# Patient Record
Sex: FEMALE | Race: OTHER | Employment: UNEMPLOYED | URBAN - METROPOLITAN AREA
[De-identification: names, ages, dates, MRNs, and addresses within clinical notes are randomized per-mention and may not be internally consistent; named-entity substitution may affect disease eponyms.]

---

## 2020-03-30 ENCOUNTER — HOSPITAL ENCOUNTER (EMERGENCY)
Age: 25
Discharge: HOME | End: 2020-03-30
Payer: COMMERCIAL

## 2020-03-30 VITALS
HEIGHT: 63 IN | TEMPERATURE: 98.3 F | RESPIRATION RATE: 16 BRPM | WEIGHT: 140 LBS | HEART RATE: 96 BPM | DIASTOLIC BLOOD PRESSURE: 71 MMHG | SYSTOLIC BLOOD PRESSURE: 111 MMHG | OXYGEN SATURATION: 98 % | BODY MASS INDEX: 24.8 KG/M2

## 2020-03-30 LAB
ALBUMIN SERPL-MCNC: 4.6 G/DL (ref 3.5–5.2)
ALP BLD-CCNC: 60 U/L (ref 35–104)
ALT SERPL-CCNC: 12 U/L (ref 0–32)
ANION GAP SERPL CALCULATED.3IONS-SCNC: 17 MMOL/L (ref 7–16)
AST SERPL-CCNC: 18 U/L (ref 0–31)
BILIRUB SERPL-MCNC: 0.4 MG/DL (ref 0–1.2)
BUN BLDV-MCNC: 5 MG/DL (ref 6–20)
CALCIUM SERPL-MCNC: 9.3 MG/DL (ref 8.6–10.2)
CHLORIDE BLD-SCNC: 103 MMOL/L (ref 98–107)
CO2: 20 MMOL/L (ref 22–29)
CREAT SERPL-MCNC: 0.8 MG/DL (ref 0.5–1)
GFR AFRICAN AMERICAN: >60
GFR NON-AFRICAN AMERICAN: >60 ML/MIN/1.73
GLUCOSE BLD-MCNC: 101 MG/DL (ref 74–99)
HCG(URINE) PREGNANCY TEST: NEGATIVE
HCT VFR BLD CALC: 40.4 % (ref 34–48)
HEMOGLOBIN: 13.6 G/DL (ref 11.5–15.5)
MCH RBC QN AUTO: 29.3 PG (ref 26–35)
MCHC RBC AUTO-ENTMCNC: 33.7 % (ref 32–34.5)
MCV RBC AUTO: 87.1 FL (ref 80–99.9)
PDW BLD-RTO: 12.7 FL (ref 11.5–15)
PLATELET # BLD: 301 E9/L (ref 130–450)
PMV BLD AUTO: 9.3 FL (ref 7–12)
POTASSIUM SERPL-SCNC: 3.8 MMOL/L (ref 3.5–5)
RBC # BLD: 4.64 E12/L (ref 3.5–5.5)
SODIUM BLD-SCNC: 140 MMOL/L (ref 132–146)
TOTAL PROTEIN: 7.6 G/DL (ref 6.4–8.3)
WBC # BLD: 9.6 E9/L (ref 4.5–11.5)

## 2020-03-30 PROCEDURE — 85027 COMPLETE CBC AUTOMATED: CPT

## 2020-03-30 PROCEDURE — 36415 COLL VENOUS BLD VENIPUNCTURE: CPT

## 2020-03-30 PROCEDURE — 6360000002 HC RX W HCPCS: Performed by: NURSE PRACTITIONER

## 2020-03-30 PROCEDURE — 2580000003 HC RX 258: Performed by: NURSE PRACTITIONER

## 2020-03-30 PROCEDURE — 96374 THER/PROPH/DIAG INJ IV PUSH: CPT

## 2020-03-30 PROCEDURE — 99284 EMERGENCY DEPT VISIT MOD MDM: CPT

## 2020-03-30 PROCEDURE — 81025 URINE PREGNANCY TEST: CPT

## 2020-03-30 PROCEDURE — 96375 TX/PRO/DX INJ NEW DRUG ADDON: CPT

## 2020-03-30 PROCEDURE — 80053 COMPREHEN METABOLIC PANEL: CPT

## 2020-03-30 RX ORDER — SUMATRIPTAN 100 MG/1
100 TABLET, FILM COATED ORAL
Status: ON HOLD | COMMUNITY
End: 2021-12-02

## 2020-03-30 RX ORDER — DIPHENHYDRAMINE HYDROCHLORIDE 50 MG/ML
12.5 INJECTION INTRAMUSCULAR; INTRAVENOUS ONCE
Status: COMPLETED | OUTPATIENT
Start: 2020-03-30 | End: 2020-03-30

## 2020-03-30 RX ORDER — NAPROXEN 500 MG/1
500 TABLET ORAL 2 TIMES DAILY WITH MEALS
COMMUNITY
End: 2020-03-30

## 2020-03-30 RX ORDER — 0.9 % SODIUM CHLORIDE 0.9 %
1000 INTRAVENOUS SOLUTION INTRAVENOUS ONCE
Status: COMPLETED | OUTPATIENT
Start: 2020-03-30 | End: 2020-03-30

## 2020-03-30 RX ORDER — NORTRIPTYLINE HYDROCHLORIDE 75 MG/1
75 CAPSULE ORAL NIGHTLY
COMMUNITY

## 2020-03-30 RX ORDER — KETOROLAC TROMETHAMINE 30 MG/ML
30 INJECTION, SOLUTION INTRAMUSCULAR; INTRAVENOUS ONCE
Status: COMPLETED | OUTPATIENT
Start: 2020-03-30 | End: 2020-03-30

## 2020-03-30 RX ORDER — METOCLOPRAMIDE HYDROCHLORIDE 5 MG/ML
10 INJECTION INTRAMUSCULAR; INTRAVENOUS ONCE
Status: COMPLETED | OUTPATIENT
Start: 2020-03-30 | End: 2020-03-30

## 2020-03-30 RX ORDER — LORATADINE 10 MG/1
10 TABLET ORAL DAILY
COMMUNITY

## 2020-03-30 RX ORDER — METOCLOPRAMIDE 10 MG/1
10 TABLET ORAL 3 TIMES DAILY PRN
Qty: 30 TABLET | Refills: 0 | Status: SHIPPED | OUTPATIENT
Start: 2020-03-30 | End: 2022-09-05

## 2020-03-30 RX ORDER — NAPROXEN 500 MG/1
500 TABLET ORAL 2 TIMES DAILY WITH MEALS
Qty: 20 TABLET | Refills: 0 | Status: SHIPPED | OUTPATIENT
Start: 2020-03-30 | End: 2022-09-05

## 2020-03-30 RX ORDER — FAMOTIDINE 40 MG/1
40 TABLET, FILM COATED ORAL EVERY EVENING
COMMUNITY
End: 2022-09-05

## 2020-03-30 RX ORDER — DEXAMETHASONE SODIUM PHOSPHATE 10 MG/ML
10 INJECTION, SOLUTION INTRAMUSCULAR; INTRAVENOUS ONCE
Status: COMPLETED | OUTPATIENT
Start: 2020-03-30 | End: 2020-03-30

## 2020-03-30 RX ADMIN — DIPHENHYDRAMINE HYDROCHLORIDE 12.5 MG: 50 INJECTION, SOLUTION INTRAMUSCULAR; INTRAVENOUS at 17:33

## 2020-03-30 RX ADMIN — KETOROLAC TROMETHAMINE 30 MG: 30 INJECTION, SOLUTION INTRAMUSCULAR at 18:13

## 2020-03-30 RX ADMIN — METOCLOPRAMIDE 10 MG: 5 INJECTION, SOLUTION INTRAMUSCULAR; INTRAVENOUS at 17:32

## 2020-03-30 RX ADMIN — DEXAMETHASONE SODIUM PHOSPHATE 10 MG: 10 INJECTION, SOLUTION INTRAMUSCULAR; INTRAVENOUS at 19:02

## 2020-03-30 RX ADMIN — SODIUM CHLORIDE 1000 ML: 9 INJECTION, SOLUTION INTRAVENOUS at 17:32

## 2020-03-30 ASSESSMENT — VISUAL ACUITY
OD: 20/20
OS: 20/20
OU: 20/20

## 2020-03-30 ASSESSMENT — PAIN SCALES - GENERAL
PAINLEVEL_OUTOF10: 7
PAINLEVEL_OUTOF10: 6
PAINLEVEL_OUTOF10: 7
PAINLEVEL_OUTOF10: 7

## 2020-03-30 ASSESSMENT — PAIN DESCRIPTION - LOCATION: LOCATION: HEAD

## 2020-03-30 NOTE — ED NOTES
No emesis-  Slight decrease in headache- ok with plan for discharge     Kimberley Rose, RN  03/30/20 3101

## 2020-03-30 NOTE — ED PROVIDER NOTES
[facility]  Department of Emergency Medicine   ED  Provider Note  Admit Date/RoomTime: 3/30/2020  4:30 PM  ED Room: 04/04   Chief Complaint:   Migraine (hx of migraine headache.  took imitrex @ 1300) and Eye Problem (\"My vision became spotty\")    History of Present Illness   Source of history provided by:  patient. History/Exam Limitations: none. Juan Guzman is a 22 y.o. old female who has a past medical hx of:   Past Medical History:   Diagnosis Date    Asthma     Migraines     presents to the emergency department by private vehicle, for complaint of gradual onset bilateral aching pain which began a few hour(s) prior to arrival.  Since onset the symptoms have been no change and mild-moderate in severity. The patient has history of migraine headaches diagnosed in the past.   Today's episode is somewhat typical for her. She has had CT, neurology consult and PCP evaluation in the past. The pain is associated with nausea, photophobia and feels like she is seeing spots and negative for fever, neck pain, numbness, weakness, speech or visual changes. The symptoms are aggravated by moving head and relieved by nothing. She took aleve and imitrex today with no improvement. There has been no history of recent trauma. She has a history of no anticoagulation use. ROS    Pertinent positives and negatives are stated within HPI, all other systems reviewed and are negative. No past surgical history on file. Social History:  reports that she has never smoked. She does not have any smokeless tobacco history on file. She reports that she does not drink alcohol or use drugs. Family History: family history is not on file.    Allergies: Latex    Physical Exam           ED Triage Vitals   BP Temp Temp Source Pulse Resp SpO2 Height Weight   03/30/20 1639 03/30/20 1639 03/30/20 1639 03/30/20 1639 03/30/20 1639 03/30/20 1639 03/30/20 1638 03/30/20 1638   112/80 98.3 °F (36.8 °C) Temporal 148 16 98 % 5' 2.5\" (1.588 m) 140 lb (63.5 kg)    Oxygen Saturation Interpretation: Normal.    Constitutional:  Alert, development consistent with age. HEENT:  NC/NT. PERRLA. Airway patent. Neck:  Normal ROM. Supple. No meningeal signs  Respiratory:  Clear to auscultation and breath sounds equal.  CV:  Regular rate and rhythm, normal heart sounds, without pathological murmurs, ectopy, gallops, or rubs. GI:  Abdomen Soft, nontender, good bowel sounds. No firm or pulsatile mass. Back:  No costovertebral tenderness. Extremities: No tenderness or edema noted. Integument:  Normal turgor. Warm, dry, without visible rash, unless noted elsewhere. Lymphatics: No lymphangitis or adenopathy noted. Neurological:  Oriented x 3, GCS 15. CNII-XII grossly intact. Motor functions intact.      Lab / Imaging Results   (All laboratory and radiology results have been personally reviewed by myself)  Labs:  Results for orders placed or performed during the hospital encounter of 03/30/20   CBC   Result Value Ref Range    WBC 9.6 4.5 - 11.5 E9/L    RBC 4.64 3.50 - 5.50 E12/L    Hemoglobin 13.6 11.5 - 15.5 g/dL    Hematocrit 40.4 34.0 - 48.0 %    MCV 87.1 80.0 - 99.9 fL    MCH 29.3 26.0 - 35.0 pg    MCHC 33.7 32.0 - 34.5 %    RDW 12.7 11.5 - 15.0 fL    Platelets 211 803 - 352 E9/L    MPV 9.3 7.0 - 12.0 fL   Comprehensive Metabolic Panel   Result Value Ref Range    Sodium 140 132 - 146 mmol/L    Potassium 3.8 3.5 - 5.0 mmol/L    Chloride 103 98 - 107 mmol/L    CO2 20 (L) 22 - 29 mmol/L    Anion Gap 17 (H) 7 - 16 mmol/L    Glucose 101 (H) 74 - 99 mg/dL    BUN 5 (L) 6 - 20 mg/dL    CREATININE 0.8 0.5 - 1.0 mg/dL    GFR Non-African American >60 >=60 mL/min/1.73    GFR African American >60     Calcium 9.3 8.6 - 10.2 mg/dL    Total Protein 7.6 6.4 - 8.3 g/dL    Alb 4.6 3.5 - 5.2 g/dL    Total Bilirubin 0.4 0.0 - 1.2 mg/dL    Alkaline Phosphatase 60 35 - 104 U/L    ALT 12 0 - 32 U/L    AST 18 0 - 31 U/L   Pregnancy, urine   Result Value Ref Range    HCG(Urine)

## 2021-11-23 ENCOUNTER — APPOINTMENT (OUTPATIENT)
Dept: CT IMAGING | Age: 26
End: 2021-11-23
Payer: COMMERCIAL

## 2021-11-23 ENCOUNTER — HOSPITAL ENCOUNTER (EMERGENCY)
Age: 26
Discharge: HOME | End: 2021-11-23
Attending: EMERGENCY MEDICINE
Payer: COMMERCIAL

## 2021-11-23 ENCOUNTER — APPOINTMENT (OUTPATIENT)
Dept: GENERAL RADIOLOGY | Age: 26
End: 2021-11-23
Payer: COMMERCIAL

## 2021-11-23 VITALS
HEART RATE: 95 BPM | DIASTOLIC BLOOD PRESSURE: 81 MMHG | RESPIRATION RATE: 20 BRPM | OXYGEN SATURATION: 100 % | TEMPERATURE: 98.4 F | SYSTOLIC BLOOD PRESSURE: 126 MMHG

## 2021-11-23 DIAGNOSIS — S62.101A CLOSED FRACTURE OF RIGHT WRIST, INITIAL ENCOUNTER: ICD-10-CM
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
## 2021-11-23 LAB
HCG, URINE, POC: NEGATIVE
Lab: NORMAL
NEGATIVE QC PASS/FAIL: NORMAL
POSITIVE QC PASS/FAIL: NORMAL

## 2021-11-23 PROCEDURE — 99284 EMERGENCY DEPT VISIT MOD MDM: CPT

## 2021-11-23 PROCEDURE — 96372 THER/PROPH/DIAG INJ SC/IM: CPT

## 2021-11-23 PROCEDURE — 6370000000 HC RX 637 (ALT 250 FOR IP): Performed by: EMERGENCY MEDICINE

## 2021-11-23 PROCEDURE — 6360000002 HC RX W HCPCS: Performed by: EMERGENCY MEDICINE

## 2021-11-23 PROCEDURE — 73110 X-RAY EXAM OF WRIST: CPT

## 2021-11-23 PROCEDURE — 2500000003 HC RX 250 WO HCPCS: Performed by: STUDENT IN AN ORGANIZED HEALTH CARE EDUCATION/TRAINING PROGRAM

## 2021-11-23 PROCEDURE — 73100 X-RAY EXAM OF WRIST: CPT

## 2021-11-23 PROCEDURE — 71045 X-RAY EXAM CHEST 1 VIEW: CPT

## 2021-11-23 PROCEDURE — 70450 CT HEAD/BRAIN W/O DYE: CPT

## 2021-11-23 PROCEDURE — 73090 X-RAY EXAM OF FOREARM: CPT

## 2021-11-23 PROCEDURE — 2500000003 HC RX 250 WO HCPCS

## 2021-11-23 PROCEDURE — 6360000002 HC RX W HCPCS: Performed by: STUDENT IN AN ORGANIZED HEALTH CARE EDUCATION/TRAINING PROGRAM

## 2021-11-23 PROCEDURE — 72125 CT NECK SPINE W/O DYE: CPT

## 2021-11-23 RX ORDER — LIDOCAINE HYDROCHLORIDE 10 MG/ML
10 INJECTION, SOLUTION EPIDURAL; INFILTRATION; INTRACAUDAL; PERINEURAL ONCE
Status: COMPLETED | OUTPATIENT
Start: 2021-11-23 | End: 2021-11-23

## 2021-11-23 RX ORDER — LIDOCAINE HYDROCHLORIDE 10 MG/ML
INJECTION, SOLUTION INFILTRATION; PERINEURAL
Status: DISCONTINUED
Start: 2021-11-23 | End: 2021-11-23

## 2021-11-23 RX ORDER — FENTANYL CITRATE 50 UG/ML
50 INJECTION, SOLUTION INTRAMUSCULAR; INTRAVENOUS ONCE
Status: COMPLETED | OUTPATIENT
Start: 2021-11-23 | End: 2021-11-23

## 2021-11-23 RX ORDER — FENTANYL CITRATE 50 UG/ML
50 INJECTION, SOLUTION INTRAMUSCULAR; INTRAVENOUS ONCE
Status: DISCONTINUED | OUTPATIENT
Start: 2021-11-23 | End: 2021-11-23

## 2021-11-23 RX ORDER — OXYCODONE HYDROCHLORIDE AND ACETAMINOPHEN 5; 325 MG/1; MG/1
1 TABLET ORAL ONCE
Status: COMPLETED | OUTPATIENT
Start: 2021-11-23 | End: 2021-11-23

## 2021-11-23 RX ORDER — HYDROCODONE BITARTRATE AND ACETAMINOPHEN 5; 325 MG/1; MG/1
1 TABLET ORAL EVERY 4 HOURS PRN
Qty: 18 TABLET | Refills: 0 | Status: SHIPPED | OUTPATIENT
Start: 2021-11-23 | End: 2021-11-26

## 2021-11-23 RX ADMIN — OXYCODONE AND ACETAMINOPHEN 1 TABLET: 5; 325 TABLET ORAL at 23:04

## 2021-11-23 RX ADMIN — FENTANYL CITRATE 50 MCG: 50 INJECTION INTRAMUSCULAR; INTRAVENOUS at 18:58

## 2021-11-23 RX ADMIN — LIDOCAINE HYDROCHLORIDE 10 ML: 10 INJECTION, SOLUTION EPIDURAL; INFILTRATION; INTRACAUDAL; PERINEURAL at 19:55

## 2021-11-23 RX ADMIN — LIDOCAINE HYDROCHLORIDE: 10 INJECTION, SOLUTION INFILTRATION; PERINEURAL at 20:50

## 2021-11-23 RX ADMIN — FENTANYL CITRATE 50 MCG: 50 INJECTION, SOLUTION INTRAMUSCULAR; INTRAVENOUS at 19:53

## 2021-11-23 ASSESSMENT — PAIN SCALES - GENERAL
PAINLEVEL_OUTOF10: 8
PAINLEVEL_OUTOF10: 10
PAINLEVEL_OUTOF10: 10
PAINLEVEL_OUTOF10: 7
PAINLEVEL_OUTOF10: 8
PAINLEVEL_OUTOF10: 10

## 2021-11-23 ASSESSMENT — PAIN DESCRIPTION - PAIN TYPE: TYPE: ACUTE PAIN

## 2021-11-23 ASSESSMENT — PAIN DESCRIPTION - DESCRIPTORS: DESCRIPTORS: SHARP;SHOOTING;POUNDING

## 2021-11-23 ASSESSMENT — PAIN DESCRIPTION - LOCATION: LOCATION: WRIST

## 2021-11-23 ASSESSMENT — PAIN DESCRIPTION - ORIENTATION: ORIENTATION: RIGHT

## 2021-11-24 ENCOUNTER — TELEPHONE (OUTPATIENT)
Dept: ORTHOPEDIC SURGERY | Age: 26
End: 2021-11-24

## 2021-11-24 NOTE — TELEPHONE ENCOUNTER
Call returned. Appointment scheduled at this time per Dr. Paulo Landau recommendations. Directions provided. Encouraged to call with questions or concerns.     Future Appointments   Date Time Provider Benita Levine   11/30/2021 11:00 AM SCHEDULE, [facility]  [facility]

## 2021-11-24 NOTE — ED NOTES
LUE splint by Ortho intact, clean and dry. Sling in place. +CMN LUE. Education on splint care given by physicians. Pt and family deny questions. Pain medication prior to d/c was requested and given.        Doug Thurston RN  11/23/21 0907
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
## 2021-11-30 ENCOUNTER — HOSPITAL ENCOUNTER (OUTPATIENT)
Dept: GENERAL RADIOLOGY | Age: 26
Discharge: HOME | End: 2021-12-02
Payer: COMMERCIAL

## 2021-11-30 ENCOUNTER — OFFICE VISIT (OUTPATIENT)
Dept: ORTHOPEDIC SURGERY | Age: 26
End: 2021-11-30
Payer: COMMERCIAL

## 2021-11-30 ENCOUNTER — PREP FOR PROCEDURE (OUTPATIENT)
Dept: ORTHOPEDIC SURGERY | Age: 26
End: 2021-11-30

## 2021-11-30 VITALS — BODY MASS INDEX: 25.69 KG/M2 | HEIGHT: 63 IN | WEIGHT: 145 LBS | TEMPERATURE: 98.5 F

## 2021-11-30 DIAGNOSIS — S52.571A OTHER CLOSED INTRA-ARTICULAR FRACTURE OF DISTAL END OF RIGHT RADIUS, INITIAL ENCOUNTER: Primary | ICD-10-CM

## 2021-11-30 DIAGNOSIS — S52.501A CLOSED FRACTURE OF DISTAL END OF RIGHT RADIUS, UNSPECIFIED FRACTURE MORPHOLOGY, INITIAL ENCOUNTER: Primary | ICD-10-CM

## 2021-11-30 DIAGNOSIS — S52.501A CLOSED FRACTURE OF DISTAL END OF RIGHT RADIUS, UNSPECIFIED FRACTURE MORPHOLOGY, INITIAL ENCOUNTER: ICD-10-CM

## 2021-11-30 PROBLEM — J45.20 MILD INTERMITTENT ASTHMA WITHOUT COMPLICATION: Status: ACTIVE | Noted: 2021-11-30

## 2021-11-30 PROCEDURE — 99203 OFFICE O/P NEW LOW 30 MIN: CPT | Performed by: PHYSICIAN ASSISTANT

## 2021-11-30 PROCEDURE — G8484 FLU IMMUNIZE NO ADMIN: HCPCS | Performed by: PHYSICIAN ASSISTANT

## 2021-11-30 PROCEDURE — 99202 OFFICE O/P NEW SF 15 MIN: CPT | Performed by: PHYSICIAN ASSISTANT

## 2021-11-30 PROCEDURE — G8427 DOCREV CUR MEDS BY ELIG CLIN: HCPCS | Performed by: PHYSICIAN ASSISTANT

## 2021-11-30 PROCEDURE — 1036F TOBACCO NON-USER: CPT | Performed by: PHYSICIAN ASSISTANT

## 2021-11-30 PROCEDURE — 73110 X-RAY EXAM OF WRIST: CPT

## 2021-11-30 PROCEDURE — G8419 CALC BMI OUT NRM PARAM NOF/U: HCPCS | Performed by: PHYSICIAN ASSISTANT

## 2021-11-30 RX ORDER — KETOROLAC TROMETHAMINE 10 MG/1
10 TABLET, FILM COATED ORAL EVERY 6 HOURS PRN
Status: ON HOLD | COMMUNITY
Start: 2021-10-07 | End: 2022-09-05 | Stop reason: HOSPADM

## 2021-11-30 RX ORDER — RIBOFLAVIN (VITAMIN B2) 100 MG
1 TABLET ORAL DAILY
COMMUNITY
Start: 2021-11-09

## 2021-11-30 RX ORDER — TRAMADOL HYDROCHLORIDE 50 MG/1
50 TABLET ORAL EVERY 6 HOURS PRN
Qty: 28 TABLET | Refills: 0 | Status: SHIPPED | OUTPATIENT
Start: 2021-11-30 | End: 2021-12-07

## 2021-11-30 RX ORDER — UBROGEPANT 100 MG/1
1 TABLET ORAL PRN
Status: ON HOLD | COMMUNITY
Start: 2021-11-20 | End: 2022-09-05 | Stop reason: HOSPADM

## 2021-11-30 RX ORDER — MONTELUKAST SODIUM 10 MG/1
1 TABLET ORAL NIGHTLY
COMMUNITY
Start: 2021-10-28

## 2021-11-30 RX ORDER — SODIUM CHLORIDE 0.9 % (FLUSH) 0.9 %
5-40 SYRINGE (ML) INJECTION PRN
Status: CANCELLED | OUTPATIENT
Start: 2021-11-30

## 2021-11-30 RX ORDER — BACLOFEN 20 MG
500 TABLET ORAL DAILY
COMMUNITY
Start: 2021-05-17

## 2021-11-30 RX ORDER — SODIUM CHLORIDE 9 MG/ML
25 INJECTION, SOLUTION INTRAVENOUS PRN
Status: CANCELLED | OUTPATIENT
Start: 2021-11-30

## 2021-11-30 RX ORDER — SODIUM CHLORIDE 0.9 % (FLUSH) 0.9 %
5-40 SYRINGE (ML) INJECTION EVERY 12 HOURS SCHEDULED
Status: CANCELLED | OUTPATIENT
Start: 2021-11-30

## 2021-11-30 RX ORDER — FREMANEZUMAB-VFRM 225 MG/1.5ML
225 INJECTION SUBCUTANEOUS
Status: ON HOLD | COMMUNITY
Start: 2021-05-28 | End: 2022-09-05 | Stop reason: HOSPADM

## 2021-11-30 NOTE — PROGRESS NOTES
[facility] 37 PRE-ADMISSION TESTING GENERAL INSTRUCTIONS- [facility]-phone number:[phone]    GENERAL INSTRUCTIONS  [x] Antibacterial Soap shower Night before and/or AM of Surgery  [] Mario wipe instruction sheet and wipes given. [x] Nothing by mouth after midnight, including gum, candy, mints, or water. [x] You may brush your teeth, gargle, but do NOT swallow water. []Hibiclens shower  the night before and the morning of surgery. Do not use             Hibiclens on your face or head. [x]No smoking, chewing tobacco, illegal drugs, or alcohol within 24 hours of your surgery. [x] Jewelry, valuables or body piercing's should not be brought to the hospital. All body and/or tongue piercing's must be removed prior to arriving to hospital.  ALL hair pins must be removed. [x] Do not wear makeup, lotions, powders, deodorant. Nail polish as directed by the nurse. [x] Arrange transportation with a responsible adult  to and from the hospital. If you do not have a responsible adult  to transport you, you will need to make arrangements with a medical transportation company (i.e. [employer]. A Uber/taxi/bus is not appropriate unless you are accompanied by a responsible adult ). Arrange for someone to be with you for the remainder of the day and for 24 hours after your procedure due to having had anesthesia. Who will be your  for transportation? __HUSBAND, TREMAINE_______________   Who will be staying with you for 24 hrs after your procedure?_________TREMAINE_________  [x] Bring insurance card and photo ID.  [] Transfusion Bracelet: Please bring with you to hospital, day of surgery  [x] Bring urine specimen day of surgery. Any small container is acceptable. [] Use inhalers the morning of surgery and bring with you to hospital.  [] Bring copy of living will or healthcare power of  papers to be placed in your electronic record.   [] CPAP/BI-PAP: Please bring your machine if you are to spend the night in the hospital.     PARKING INSTRUCTIONS:   [x] Arrival Time:____1200_________  · [x] Parking lot '\"I\"  is located on [facility] (the corner of [facility] and [facility]). To enter, press the button and the gate will lift. A free token will be provided to exit the lot. One car per patient is allowed to park in this lot. All other cars are to park on [address] [place] [address] either in the parking garage or the handicap lot. [] To reach the [facility] lobby from [address] [place] [address], upon entering the hospital, take elevator B to the 3rd floor. EDUCATION INSTRUCTIONS:      [] Knee or hip replacement booklet & exercise pamphlets given. [] Onel 77 placed in chart. [] Pre-admission Testing educational folder given  [x] Incentive Spirometry,coughing & deep breathing exercises reviewed. []Medication information sheet(s)   [x]Fluoroscopy-Xray used in surgery reviewed with patient. Educational pamphlet placed in chart. [x]Pain: Post-op pain is normal and to be expected. You will be asked to rate your pain from 0-10(a zero is not acceptable-education is needed). Your post-op pain goal is:  [x] Ask your nurse for your pain medication. [] Joint camp offered. [] Joint replacement booklets given. [] Other:___________________________    MEDICATION INSTRUCTIONS:   [x]Bring a complete list of your medications, please write the last time you took the medicine, give this list to the nurse. [x] Take the following medications the morning of surgery with 1-2 ounces of water:   [x] Stop herbal supplements and vitamins 5 days before your surgery. [] DO NOT take any diabetic medicine the morning of surgery. Follow instructions for insulin the day before surgery. [] If you are diabetic and your blood sugar is low or you feel symptomatic, you may drink 1-2 ounces of apple juice or take a glucose tablet.   The morning of your procedure, you may call the pre-op area if you have concerns about your blood sugar [phone]. [x] Use your inhalers the morning of surgery. Bring your emergency inhaler with you day of surgery. [x] Follow physician instructions regarding any blood thinners you may be taking. WHAT TO EXPECT:  [x] The day of surgery you will be greeted and checked in by the [employer].  In addition, you will be registered in the [facility] by a Patient Access Representative. Please bring your photo ID and insurance card. A nurse will greet you in accordance to the time you are needed in the pre-op area to prepare you for surgery. Please do not be discouraged if you are not greeted in the order you arrive as there are many variables that are involved in patient preparation. Your patience is greatly appreciated as you wait for your nurse. Please bring in items such as: books, magazines, newspapers, electronics, or any other items  to occupy your time in the waiting area. [x]  Delays may occur with surgery and staff will make a sincere effort to keep you informed of delays. If any delays occur with your procedure, we apologize ahead of time for your inconvenience as we recognize the value of your time.

## 2021-11-30 NOTE — H&P
chills, diaphoresis, appetite change and fatigue. HENT: Negative for dental issues, hearing loss and tinnitus. Negative for congestion, sinus pressure, sneezing, sore throat. Negative for headache. Eyes: Negative for visual disturbance, blurred and double vision. Negative for pain, discharge, redness and itching  Respiratory: Negative for cough, shortness of breath and wheezing. Cardiovascular: Negative for chest pain, palpitations and leg swelling. No dyspnea on exertion   Gastrointestinal:   Negative for nausea, vomiting, abdominal pain, diarrhea, constipation  or black or bloody. Hematologic\Lymphatic:  negative for bleeding, petechiae,   Genitourinary: Negative for hematuria and difficulty urinating. Musculoskeletal: Negative for neck pain and stiffness. Mild for back pain, negative joint swelling and gait problem. Skin: Negative for pallor, rash and wound. Neurological: Negative for dizziness, tremors, seizures, weakness, light-headedness, no TIA or stroke symptoms. No numbness and headaches.    Psychiatric/Behavioral: Negative.      Physical Examination:   General appearance: alert, well appearing, and in no distress,  normal appearing weight  Mental status: alert, oriented to person, place, and time, normal mood, behavior, speech, dress, motor activity, and thought processes  Abdomen: soft, nondistended   Resp:   resp easy and unlabored, no audible wheezes note  Cardiac: distal pulses palpable, skin well perfused  Neurological: alert, oriented X3, normal speech, no focal findings or movement disorder noted, motor and sensory grossly normal bilaterally, normal muscle tone, no tremors, strength 5/5, normal gait and station  HEENT: normochephalic atraumatic, external ears and eyes normal, sclera normal, neck supple  Extremities:   peripheral pulses normal, no edema, redness or tenderness in the calves   Skin: normal coloration, no rashes or open wounds, no suspicious skin lesions noted  Psych: Affect euthymic   Musculoskeletal:    Extremity:  right Upper Extremity  · Sugartong splint clean/dry/intact, well fitting  · Skin intact circumferentially outside of the splint  · No pain with passive stretch of fingers  · Mild edema to the fingers  · +AIN/PIN/Ulnar nerve function intact grossly  · +Radial pulse, Brisk Cap refill, hand warm and perfused  · Sensation intact to touch in radial/ulnar/median nerve distributions to hand     Temp 98.5 °F (36.9 °C) (Oral)   Ht 5' 2.5\" (1.588 m)   Wt 145 lb (65.8 kg)   LMP 11/22/2021   BMI 26.10 kg/m²      XR: 11/30/21 3V of the R wrist demonstrating an intra-articular distal radius fracture. [place] volar. Highly comminuted with impaction at the lunate facet. Overall shortening evident.     ASSESSMENT:       Diagnosis Orders   1. Other closed intra-articular fracture of distal end of right radius, initial encounter  traMADol (ULTRAM) 50 MG tablet     Ascorbic Acid (VITAMIN C) 500 MG CHEW         Discussion:  Had lengthy discussion with patient regarding Her diagnosis, typical prognosis, and expected outcomes. I reviewed the possible complications from the injury itself despite treatment choosen. I also discussed treatment options including nonoperative managements versus surgical management, along with risks and benefits of each. They have elected for surgical management at this time.       Discussed with patient factors that can impact patient's fracture healing. Patient has the following risk factors for union: none identified     Risk, benefits and treatment options discussed with NORA Byrd. she has verbalized understanding of options.  The possibility of complications were also discussed to include but not limited to nerve damage, infection, problems with wound healing, vascular injury, chronic pain, stiffness, dysfunction, nonhealing of the bone, symptomatic hardware and/or its failure, need for subsequent surgery, dislocation, and blood clots as well as medical related problems and other problems not specifically discussed. Risk of anesthesia also discussed to include death. Post-op care, work, activity and restrictions which included the use of pain medication and possibility of using blood thinner post op were also discussed with Thalia Swartz and she verbalized and agreed with the restrictions.      PLAN:  1. Your surgery is scheduled for Thursday 12/2/21 at 2:00 PM  with Dr. Shanita Boucher MD at the [facility] on [facility] in [facility] . You will need to report to Preop area  that morning at 12:00 PM    2. You are having Outpatient surgery so you will be returning home the same day  3. Preadmission Testing (PAT) department at [facility] will contact you with all the details prior to surgery. 4. Nothing to eat or drink after midnight the night before surgery. You may take a pain pill and any other medicine PAT instructs you to take with small sip of water if needed. 5. Keep splint clean and dry. Do not remove or get wet. 6. Continue with ice and elevation to reduce swelling  7. No weight bearing right upper extremity, use assistive devices  8. Take pain medicine as instructed  9. Call office with any question or concerns: [phone]. Hold Aspirin the day of surgery.  Hold all NSAIDs(ibuprofen/aleve/motrin/TORADOL) Now until surgery  Pre-op COVID-19 testing not indicated  Patient Vaccinated with "[employer]" SYSTEM - BASTROP 7/16/21, 8/13/21  Started patient on Vitamin C   Pain medication prescribed- Tramadol     Controlled Substance Monitoring:     Acute and Chronic Pain Monitoring:   RX Monitoring 11/30/2021   Periodic Controlled Substance Monitoring No signs of potential drug abuse or diversion identified.      Electronically signed by Mahi Herzog PA-C on 11/30/2021 at 1:52 PM  Note: This report was completed using computerize voiced recognition [employer] effort has been made to ensure accuracy; however, inadvertent computerized transcription errors may be present. Office Visit on 11/30/2021           Office Visit on 11/30/2021                Detailed Report            Note shared with patient        Progress Notes Info    Author Note Status Last Update User Last Update Date/Time   Crescencio Goldman PA-C Signed Crescencio Goldman PA-C 11/30/2021  1:52 PM     Chart Review Routing History    No routing history on file.

## 2021-11-30 NOTE — PATIENT INSTRUCTIONS
1. Your surgery is scheduled for Thursday 12/2/21 at 2:00 PM  with Dr. Beatriz Rivera MD at the main [address] [facility] on [facility] in [facility] . You will need to report to Preop area  that morning at 12:00 PM    2. You are having Outpatient surgery so you will be returning home the same day  3. Preadmission Testing (PAT) department at [facility] will contact you with all the details prior to surgery. 4. Nothing to eat or drink after midnight the night before surgery. You may take a pain pill and any other medicine PAT instructs you to take with small sip of water if needed. 5. Keep splint clean and dry. Do not remove or get wet. 6. Continue with ice and elevation to reduce swelling  7. No weight bearing right upper extremity, use assistive devices  8. Take pain medicine as instructed  9. Call office with any question or concerns: [phone]. Hold Aspirin the day of surgery. Hold all NSAIDs(ibuprofen/aleve/motrin/TORADOL) Now until surgery    All surgical patients will be gradually tapered off narcotic pain medication post operatively, this office will not continue narcotics longer than 6 weeks from date of surgery. If narcotics are required longer than this time period without objective finding you will be referred to pain management. Plan for trial of Ultram for pain control- if this is not effective can go back to [place] if you would prefer    Will start with Vitamin C to help prevent complex regional pain syndrome to the wrist    Also plan for off work x 2 weeks post op then if you are comfortable we can send back to work with modifications    Open Reduction With Internal Fixation for Limb Fracture: Before Your Surgery    What is open reduction with internal fixation? Open reduction with internal fixation is a type of surgery to fix a broken (fractured) bone. The doctor makes a cut, called an incision, in the skin over the bone.  The doctor then moves the pieces of bone back into the normal position. This is called open reduction. The doctor may use special screws, pins, plates, or rods to hold the bone in place while it heals. This is called internal fixation. These devices may stay in your body from now on. The doctor closes the incision with stitches. You will have a scar, but it will fade with time. You may spend from a few hours to a few days in the hospital. This depends on how serious your injury is. It usually takes 6 to 12 weeks for a broken bone to heal.    How soon you can go back to work and your normal routine depends on your job. It also depends on how long it takes your bone to heal. For example, if you have a broken leg and you sit at work, you may be able to go back in 1 to 2 weeks. But if your job requires you to walk or stand a lot, you may need to wait until your bone has healed.
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
## 2021-12-01 ENCOUNTER — ANESTHESIA EVENT (OUTPATIENT)
Dept: OPERATING ROOM | Age: 26
End: 2021-12-01
Payer: COMMERCIAL

## 2021-12-02 ENCOUNTER — APPOINTMENT (OUTPATIENT)
Dept: GENERAL RADIOLOGY | Age: 26
End: 2021-12-02
Attending: ORTHOPAEDIC SURGERY
Payer: COMMERCIAL

## 2021-12-02 ENCOUNTER — ANESTHESIA (OUTPATIENT)
Dept: OPERATING ROOM | Age: 26
End: 2021-12-02
Payer: COMMERCIAL

## 2021-12-02 ENCOUNTER — HOSPITAL ENCOUNTER (OUTPATIENT)
Age: 26
Setting detail: OUTPATIENT SURGERY
Discharge: HOME | End: 2021-12-02
Attending: ORTHOPAEDIC SURGERY | Admitting: ORTHOPAEDIC SURGERY
Payer: COMMERCIAL

## 2021-12-02 VITALS
SYSTOLIC BLOOD PRESSURE: 119 MMHG | HEIGHT: 63 IN | TEMPERATURE: 97.3 F | RESPIRATION RATE: 9 BRPM | WEIGHT: 145 LBS | DIASTOLIC BLOOD PRESSURE: 88 MMHG | OXYGEN SATURATION: 95 % | BODY MASS INDEX: 25.69 KG/M2 | HEART RATE: 113 BPM

## 2021-12-02 VITALS — DIASTOLIC BLOOD PRESSURE: 62 MMHG | OXYGEN SATURATION: 100 % | SYSTOLIC BLOOD PRESSURE: 115 MMHG | TEMPERATURE: 94.5 F

## 2021-12-02 DIAGNOSIS — Z01.812 PRE-OPERATIVE LABORATORY EXAMINATION: Primary | ICD-10-CM

## 2021-12-02 DIAGNOSIS — S52.571A OTHER CLOSED INTRA-ARTICULAR FRACTURE OF DISTAL END OF RIGHT RADIUS, INITIAL ENCOUNTER: ICD-10-CM

## 2021-12-02 LAB
BASOPHILS ABSOLUTE: 0.04 E9/L (ref 0–0.2)
BASOPHILS RELATIVE PERCENT: 0.6 % (ref 0–2)
EOSINOPHILS ABSOLUTE: 0.45 E9/L (ref 0.05–0.5)
EOSINOPHILS RELATIVE PERCENT: 6.8 % (ref 0–6)
HCG, URINE, POC: NEGATIVE
HCT VFR BLD CALC: 39 % (ref 34–48)
HEMOGLOBIN: 13.1 G/DL (ref 11.5–15.5)
IMMATURE GRANULOCYTES #: 0.01 E9/L
IMMATURE GRANULOCYTES %: 0.2 % (ref 0–5)
INR BLD: 1.1
LYMPHOCYTES ABSOLUTE: 2.2 E9/L (ref 1.5–4)
LYMPHOCYTES RELATIVE PERCENT: 33.2 % (ref 20–42)
Lab: NORMAL
MCH RBC QN AUTO: 28.2 PG (ref 26–35)
MCHC RBC AUTO-ENTMCNC: 33.6 % (ref 32–34.5)
MCV RBC AUTO: 83.9 FL (ref 80–99.9)
MONOCYTES ABSOLUTE: 0.58 E9/L (ref 0.1–0.95)
MONOCYTES RELATIVE PERCENT: 8.7 % (ref 2–12)
NEGATIVE QC PASS/FAIL: NORMAL
NEUTROPHILS ABSOLUTE: 3.35 E9/L (ref 1.8–7.3)
NEUTROPHILS RELATIVE PERCENT: 50.5 % (ref 43–80)
PDW BLD-RTO: 12.5 FL (ref 11.5–15)
PLATELET # BLD: 264 E9/L (ref 130–450)
PMV BLD AUTO: 9.1 FL (ref 7–12)
POSITIVE QC PASS/FAIL: NORMAL
PROTHROMBIN TIME: 12.4 SEC (ref 9.3–12.4)
RBC # BLD: 4.65 E12/L (ref 3.5–5.5)
REASON FOR REJECTION: NORMAL
REJECTED TEST: NORMAL
WBC # BLD: 6.6 E9/L (ref 4.5–11.5)

## 2021-12-02 PROCEDURE — 2709999900 HC NON-CHARGEABLE SUPPLY: Performed by: ORTHOPAEDIC SURGERY

## 2021-12-02 PROCEDURE — 3209999900 FLUORO FOR SURGICAL PROCEDURES

## 2021-12-02 PROCEDURE — 73110 X-RAY EXAM OF WRIST: CPT

## 2021-12-02 PROCEDURE — 2720000010 HC SURG SUPPLY STERILE: Performed by: ORTHOPAEDIC SURGERY

## 2021-12-02 PROCEDURE — 25609 OPTX DST RD XART FX/EP SEP3+: CPT | Performed by: ORTHOPAEDIC SURGERY

## 2021-12-02 PROCEDURE — 85025 COMPLETE CBC W/AUTO DIFF WBC: CPT

## 2021-12-02 PROCEDURE — C1713 ANCHOR/SCREW BN/BN,TIS/BN: HCPCS | Performed by: ORTHOPAEDIC SURGERY

## 2021-12-02 PROCEDURE — 6360000002 HC RX W HCPCS: Performed by: ANESTHESIOLOGY

## 2021-12-02 PROCEDURE — 6370000000 HC RX 637 (ALT 250 FOR IP): Performed by: ANESTHESIOLOGY

## 2021-12-02 PROCEDURE — 3600000014 HC SURGERY LEVEL 4 ADDTL 15MIN: Performed by: ORTHOPAEDIC SURGERY

## 2021-12-02 PROCEDURE — 6360000002 HC RX W HCPCS

## 2021-12-02 PROCEDURE — 2580000003 HC RX 258: Performed by: PHYSICIAN ASSISTANT

## 2021-12-02 PROCEDURE — 3700000000 HC ANESTHESIA ATTENDED CARE: Performed by: ORTHOPAEDIC SURGERY

## 2021-12-02 PROCEDURE — 6360000002 HC RX W HCPCS: Performed by: PHYSICIAN ASSISTANT

## 2021-12-02 PROCEDURE — 3600000004 HC SURGERY LEVEL 4 BASE: Performed by: ORTHOPAEDIC SURGERY

## 2021-12-02 PROCEDURE — 3700000001 HC ADD 15 MINUTES (ANESTHESIA): Performed by: ORTHOPAEDIC SURGERY

## 2021-12-02 PROCEDURE — 7100000000 HC PACU RECOVERY - FIRST 15 MIN: Performed by: ORTHOPAEDIC SURGERY

## 2021-12-02 PROCEDURE — 36415 COLL VENOUS BLD VENIPUNCTURE: CPT

## 2021-12-02 PROCEDURE — 7100000001 HC PACU RECOVERY - ADDTL 15 MIN: Performed by: ORTHOPAEDIC SURGERY

## 2021-12-02 PROCEDURE — 2500000003 HC RX 250 WO HCPCS

## 2021-12-02 PROCEDURE — 85610 PROTHROMBIN TIME: CPT

## 2021-12-02 PROCEDURE — 7100000010 HC PHASE II RECOVERY - FIRST 15 MIN: Performed by: ORTHOPAEDIC SURGERY

## 2021-12-02 PROCEDURE — 7100000011 HC PHASE II RECOVERY - ADDTL 15 MIN: Performed by: ORTHOPAEDIC SURGERY

## 2021-12-02 DEVICE — SCREW BNE L20MM DIA2.4MM CORT S STL ST T8 STARDRV RECESS: Type: IMPLANTABLE DEVICE | Site: WRIST | Status: FUNCTIONAL

## 2021-12-02 DEVICE — SCREW BNE L16MM DIA2.4MM DST RAD VOLAR S STL ST VAR ANG LOK: Type: IMPLANTABLE DEVICE | Site: WRIST | Status: FUNCTIONAL

## 2021-12-02 DEVICE — SCREW BNE L18MM DIA2.4MM CORT S STL ST T8 STARDRV RECESS: Type: IMPLANTABLE DEVICE | Site: WRIST | Status: FUNCTIONAL

## 2021-12-02 DEVICE — SCREW BNE L12MM DIA2.7MM CORT S STL ST T8 STARDRV RECESS: Type: IMPLANTABLE DEVICE | Site: WRIST | Status: FUNCTIONAL

## 2021-12-02 DEVICE — PLATE BNE W22XL54MM STD 6X3 H ST R DST RAD VOLAR S STL VAR: Type: IMPLANTABLE DEVICE | Site: WRIST | Status: FUNCTIONAL

## 2021-12-02 DEVICE — SCREW BNE L18MM DIA2.4MM DST RAD VOLAR S STL ST VAR ANG LOK: Type: IMPLANTABLE DEVICE | Site: WRIST | Status: FUNCTIONAL

## 2021-12-02 DEVICE — SCREW BNE L14MM DIA2.4MM DST RAD VOLAR S STL ST VAR ANG LOK: Type: IMPLANTABLE DEVICE | Site: WRIST | Status: FUNCTIONAL

## 2021-12-02 RX ORDER — MIDAZOLAM HYDROCHLORIDE 1 MG/ML
INJECTION INTRAMUSCULAR; INTRAVENOUS PRN
Status: DISCONTINUED | OUTPATIENT
Start: 2021-12-02 | End: 2021-12-02 | Stop reason: SDUPTHER

## 2021-12-02 RX ORDER — LIDOCAINE HYDROCHLORIDE 20 MG/ML
INJECTION, SOLUTION INTRAVENOUS PRN
Status: DISCONTINUED | OUTPATIENT
Start: 2021-12-02 | End: 2021-12-02 | Stop reason: SDUPTHER

## 2021-12-02 RX ORDER — HYDRALAZINE HYDROCHLORIDE 20 MG/ML
5 INJECTION INTRAMUSCULAR; INTRAVENOUS EVERY 10 MIN PRN
Status: DISCONTINUED | OUTPATIENT
Start: 2021-12-02 | End: 2021-12-02 | Stop reason: HOSPADM

## 2021-12-02 RX ORDER — PROMETHAZINE HYDROCHLORIDE 25 MG/ML
6.25 INJECTION, SOLUTION INTRAMUSCULAR; INTRAVENOUS
Status: DISCONTINUED | OUTPATIENT
Start: 2021-12-02 | End: 2021-12-02 | Stop reason: HOSPADM

## 2021-12-02 RX ORDER — ROCURONIUM BROMIDE 10 MG/ML
INJECTION, SOLUTION INTRAVENOUS PRN
Status: DISCONTINUED | OUTPATIENT
Start: 2021-12-02 | End: 2021-12-02 | Stop reason: SDUPTHER

## 2021-12-02 RX ORDER — LABETALOL HYDROCHLORIDE 5 MG/ML
5 INJECTION, SOLUTION INTRAVENOUS EVERY 10 MIN PRN
Status: DISCONTINUED | OUTPATIENT
Start: 2021-12-02 | End: 2021-12-02 | Stop reason: HOSPADM

## 2021-12-02 RX ORDER — SODIUM CHLORIDE 9 MG/ML
25 INJECTION, SOLUTION INTRAVENOUS PRN
Status: DISCONTINUED | OUTPATIENT
Start: 2021-12-02 | End: 2021-12-02 | Stop reason: HOSPADM

## 2021-12-02 RX ORDER — ESMOLOL HYDROCHLORIDE 10 MG/ML
INJECTION INTRAVENOUS PRN
Status: DISCONTINUED | OUTPATIENT
Start: 2021-12-02 | End: 2021-12-02 | Stop reason: SDUPTHER

## 2021-12-02 RX ORDER — PROPOFOL 10 MG/ML
INJECTION, EMULSION INTRAVENOUS PRN
Status: DISCONTINUED | OUTPATIENT
Start: 2021-12-02 | End: 2021-12-02 | Stop reason: SDUPTHER

## 2021-12-02 RX ORDER — FENTANYL CITRATE 50 UG/ML
INJECTION, SOLUTION INTRAMUSCULAR; INTRAVENOUS PRN
Status: DISCONTINUED | OUTPATIENT
Start: 2021-12-02 | End: 2021-12-02 | Stop reason: SDUPTHER

## 2021-12-02 RX ORDER — OXYCODONE HYDROCHLORIDE AND ACETAMINOPHEN 5; 325 MG/1; MG/1
1 TABLET ORAL EVERY 6 HOURS PRN
Qty: 28 TABLET | Refills: 0 | Status: SHIPPED | OUTPATIENT
Start: 2021-12-02 | End: 2021-12-10 | Stop reason: SDUPTHER

## 2021-12-02 RX ORDER — DIPHENHYDRAMINE HYDROCHLORIDE 50 MG/ML
12.5 INJECTION INTRAMUSCULAR; INTRAVENOUS
Status: DISCONTINUED | OUTPATIENT
Start: 2021-12-02 | End: 2021-12-02 | Stop reason: HOSPADM

## 2021-12-02 RX ORDER — SODIUM CHLORIDE 0.9 % (FLUSH) 0.9 %
5-40 SYRINGE (ML) INJECTION PRN
Status: DISCONTINUED | OUTPATIENT
Start: 2021-12-02 | End: 2021-12-02 | Stop reason: HOSPADM

## 2021-12-02 RX ORDER — SODIUM CHLORIDE 0.9 % (FLUSH) 0.9 %
5-40 SYRINGE (ML) INJECTION EVERY 12 HOURS SCHEDULED
Status: DISCONTINUED | OUTPATIENT
Start: 2021-12-02 | End: 2021-12-02 | Stop reason: HOSPADM

## 2021-12-02 RX ORDER — ONDANSETRON 2 MG/ML
INJECTION INTRAMUSCULAR; INTRAVENOUS PRN
Status: DISCONTINUED | OUTPATIENT
Start: 2021-12-02 | End: 2021-12-02 | Stop reason: SDUPTHER

## 2021-12-02 RX ORDER — DEXAMETHASONE SODIUM PHOSPHATE 10 MG/ML
INJECTION INTRAMUSCULAR; INTRAVENOUS PRN
Status: DISCONTINUED | OUTPATIENT
Start: 2021-12-02 | End: 2021-12-02 | Stop reason: SDUPTHER

## 2021-12-02 RX ORDER — MEPERIDINE HYDROCHLORIDE 25 MG/ML
12.5 INJECTION INTRAMUSCULAR; INTRAVENOUS; SUBCUTANEOUS EVERY 5 MIN PRN
Status: DISCONTINUED | OUTPATIENT
Start: 2021-12-02 | End: 2021-12-02 | Stop reason: HOSPADM

## 2021-12-02 RX ORDER — HYDROCODONE BITARTRATE AND ACETAMINOPHEN 5; 325 MG/1; MG/1
2 TABLET ORAL PRN
Status: COMPLETED | OUTPATIENT
Start: 2021-12-02 | End: 2021-12-02

## 2021-12-02 RX ORDER — HYDROCODONE BITARTRATE AND ACETAMINOPHEN 5; 325 MG/1; MG/1
1 TABLET ORAL PRN
Status: COMPLETED | OUTPATIENT
Start: 2021-12-02 | End: 2021-12-02

## 2021-12-02 RX ORDER — PROCHLORPERAZINE EDISYLATE 5 MG/ML
5 INJECTION INTRAMUSCULAR; INTRAVENOUS
Status: COMPLETED | OUTPATIENT
Start: 2021-12-02 | End: 2021-12-02

## 2021-12-02 RX ADMIN — PROPOFOL 200 MG: 10 INJECTION, EMULSION INTRAVENOUS at 18:38

## 2021-12-02 RX ADMIN — FENTANYL CITRATE 50 MCG: 50 INJECTION, SOLUTION INTRAMUSCULAR; INTRAVENOUS at 19:26

## 2021-12-02 RX ADMIN — HYDROMORPHONE HYDROCHLORIDE 0.5 MG: 1 INJECTION, SOLUTION INTRAMUSCULAR; INTRAVENOUS; SUBCUTANEOUS at 20:31

## 2021-12-02 RX ADMIN — SODIUM CHLORIDE: 9 INJECTION, SOLUTION INTRAVENOUS at 18:43

## 2021-12-02 RX ADMIN — LIDOCAINE HYDROCHLORIDE 100 MG: 20 INJECTION, SOLUTION INTRAVENOUS at 18:38

## 2021-12-02 RX ADMIN — Medication 2000 MG: at 18:42

## 2021-12-02 RX ADMIN — HYDROCODONE BITARTRATE AND ACETAMINOPHEN 2 TABLET: 5; 325 TABLET ORAL at 21:13

## 2021-12-02 RX ADMIN — DEXAMETHASONE SODIUM PHOSPHATE 10 MG: 10 INJECTION INTRAMUSCULAR; INTRAVENOUS at 18:44

## 2021-12-02 RX ADMIN — HYDROMORPHONE HYDROCHLORIDE 0.5 MG: 1 INJECTION, SOLUTION INTRAMUSCULAR; INTRAVENOUS; SUBCUTANEOUS at 20:18

## 2021-12-02 RX ADMIN — MEPERIDINE HYDROCHLORIDE 12.5 MG: 25 INJECTION, SOLUTION INTRAMUSCULAR; INTRAVENOUS; SUBCUTANEOUS at 20:21

## 2021-12-02 RX ADMIN — ESMOLOL HYDROCHLORIDE 10 MG: 10 INJECTION, SOLUTION INTRAVENOUS at 18:48

## 2021-12-02 RX ADMIN — HYDROMORPHONE HYDROCHLORIDE 0.5 MG: 1 INJECTION, SOLUTION INTRAMUSCULAR; INTRAVENOUS; SUBCUTANEOUS at 20:25

## 2021-12-02 RX ADMIN — ROCURONIUM BROMIDE 10 MG: 10 INJECTION, SOLUTION INTRAVENOUS at 18:38

## 2021-12-02 RX ADMIN — SODIUM CHLORIDE 25 ML: 9 INJECTION, SOLUTION INTRAVENOUS at 12:00

## 2021-12-02 RX ADMIN — MIDAZOLAM 2 MG: 1 INJECTION INTRAMUSCULAR; INTRAVENOUS at 18:32

## 2021-12-02 RX ADMIN — PROCHLORPERAZINE EDISYLATE 5 MG: 5 INJECTION INTRAMUSCULAR; INTRAVENOUS at 21:13

## 2021-12-02 RX ADMIN — HYDROMORPHONE HYDROCHLORIDE 0.5 MG: 1 INJECTION, SOLUTION INTRAMUSCULAR; INTRAVENOUS; SUBCUTANEOUS at 20:09

## 2021-12-02 RX ADMIN — FENTANYL CITRATE 100 MCG: 50 INJECTION, SOLUTION INTRAMUSCULAR; INTRAVENOUS at 18:38

## 2021-12-02 RX ADMIN — ONDANSETRON HYDROCHLORIDE 4 MG: 2 SOLUTION INTRAMUSCULAR; INTRAVENOUS at 18:44

## 2021-12-02 ASSESSMENT — PAIN DESCRIPTION - PAIN TYPE
TYPE: SURGICAL PAIN;ACUTE PAIN

## 2021-12-02 ASSESSMENT — PULMONARY FUNCTION TESTS
PIF_VALUE: 1
PIF_VALUE: 21
PIF_VALUE: 10
PIF_VALUE: 25
PIF_VALUE: 1
PIF_VALUE: 20
PIF_VALUE: 18
PIF_VALUE: 19
PIF_VALUE: 18
PIF_VALUE: 21
PIF_VALUE: 18
PIF_VALUE: 20
PIF_VALUE: 2
PIF_VALUE: 18
PIF_VALUE: 1
PIF_VALUE: 20
PIF_VALUE: 19
PIF_VALUE: 24
PIF_VALUE: 18
PIF_VALUE: 23
PIF_VALUE: 15
PIF_VALUE: 18
PIF_VALUE: 11
PIF_VALUE: 15
PIF_VALUE: 19
PIF_VALUE: 1
PIF_VALUE: 10
PIF_VALUE: 18
PIF_VALUE: 8
PIF_VALUE: 22
PIF_VALUE: 20
PIF_VALUE: 1
PIF_VALUE: 16
PIF_VALUE: 15
PIF_VALUE: 19
PIF_VALUE: 18
PIF_VALUE: 2
PIF_VALUE: 20
PIF_VALUE: 8
PIF_VALUE: 21
PIF_VALUE: 0
PIF_VALUE: 2
PIF_VALUE: 18
PIF_VALUE: 18
PIF_VALUE: 10
PIF_VALUE: 10
PIF_VALUE: 21
PIF_VALUE: 18
PIF_VALUE: 15
PIF_VALUE: 19
PIF_VALUE: 19
PIF_VALUE: 18
PIF_VALUE: 20
PIF_VALUE: 19
PIF_VALUE: 18
PIF_VALUE: 6
PIF_VALUE: 20
PIF_VALUE: 20
PIF_VALUE: 18
PIF_VALUE: 23
PIF_VALUE: 19
PIF_VALUE: 22
PIF_VALUE: 18
PIF_VALUE: 1
PIF_VALUE: 19
PIF_VALUE: 20
PIF_VALUE: 18

## 2021-12-02 ASSESSMENT — PAIN SCALES - GENERAL
PAINLEVEL_OUTOF10: 10
PAINLEVEL_OUTOF10: 3
PAINLEVEL_OUTOF10: 8
PAINLEVEL_OUTOF10: 10
PAINLEVEL_OUTOF10: 7
PAINLEVEL_OUTOF10: 10
PAINLEVEL_OUTOF10: 10

## 2021-12-02 ASSESSMENT — PAIN DESCRIPTION - LOCATION
LOCATION: ARM
LOCATION: ARM;WRIST
LOCATION: WRIST;ARM

## 2021-12-02 ASSESSMENT — PAIN DESCRIPTION - FREQUENCY
FREQUENCY: CONTINUOUS

## 2021-12-02 ASSESSMENT — PAIN DESCRIPTION - DESCRIPTORS
DESCRIPTORS: DISCOMFORT
DESCRIPTORS: SHARP;CONSTANT;DISCOMFORT
DESCRIPTORS: SHARP;CONSTANT;DISCOMFORT

## 2021-12-02 ASSESSMENT — PAIN DESCRIPTION - PROGRESSION
CLINICAL_PROGRESSION: GRADUALLY IMPROVING

## 2021-12-02 ASSESSMENT — PAIN DESCRIPTION - ORIENTATION
ORIENTATION: RIGHT

## 2021-12-02 ASSESSMENT — PAIN DESCRIPTION - ONSET
ONSET: PROGRESSIVE
ONSET: GRADUAL
ONSET: GRADUAL

## 2021-12-02 ASSESSMENT — PAIN DESCRIPTION - DIRECTION
RADIATING_TOWARDS: SHOULDER

## 2021-12-02 ASSESSMENT — PAIN - FUNCTIONAL ASSESSMENT: PAIN_FUNCTIONAL_ASSESSMENT: 0-10

## 2021-12-02 NOTE — INTERVAL H&P NOTE
Update History & Physical    The patient's History and Physical of November 30, 2021 was reviewed with the patient and I examined the patient. There was no change. The surgical site was confirmed by the patient and me. Talk to the patient detail about fixation of her right distal radius. Explained that she could get posttraumatic arthritis. Went with the risk of surgery including death and anesthesia. Talked about wound healing issues, infection, need for further operations, need for physical therapy, chronic pain, and any other unforeseeable complications. She understood this and decided to go forward with the procedure. Plan: The risks, benefits, expected outcome, and alternative to the recommended procedure have been discussed with the patient. Patient understands and wants to proceed with the procedure.      Electronically signed by Rex Swain MD on 12/2/2021 at 11:56 AM

## 2021-12-02 NOTE — ANESTHESIA PRE PROCEDURE
Department of Anesthesiology  Preprocedure Note       Name:  Shahriar Goddard   Age:  23 y.o.  :  1995                                          MRN:  [id number]         Date:  2021      Surgeon: Taylor Signs):  Ilda Lewis MD    Procedure: Procedure(s):  RIGHT DISTAL RADIUS OPEN REDUCTION INTERNAL FIXATION--SYNTHES    Medications prior to admission:   Prior to Admission medications    Medication Sig Start Date End Date Taking? Authorizing Provider   Fremanezumab-vfrm (AJOVY) 225 MG/1.5ML SOAJ Inject 225 mg into the skin every 30 days 21   Historical Provider, MD   ketorolac (TORADOL) 10 MG tablet Take 10 mg by mouth every 6 hours as needed 10/7/21   Historical Provider, MD   Magnesium Oxide 500 MG TABS Take 500 mg by mouth daily 21   Historical Provider, MD   montelukast (SINGULAIR) 10 MG tablet Take 1 tablet by mouth nightly 10/28/21   Historical Provider, MD   Riboflavin (B-2) 100 MG TABS Take 1 tablet by mouth daily 21   Historical Provider, MD   UBRELVY 100 MG TABS Take 1 tablet by mouth as needed If needed repeat after 3 hours. Max dose 3 daily 21   Historical Provider, MD   traMADol (ULTRAM) 50 MG tablet Take 1 tablet by mouth every 6 hours as needed for Pain for up to 7 days. Intended supply: 7 days.  Take lowest dose possible to manage pain 21  Katia Gaytan PA-C   Ascorbic Acid (VITAMIN C) 500 MG CHEW Take 500 mg by mouth daily 21   Tony Gaytan PA-C   SUMAtriptan (IMITREX) 100 MG tablet Take 100 mg by mouth once as needed for Migraine  Patient not taking: Reported on 2021    Historical Provider, MD   nortriptyline (PAMELOR) 75 MG capsule Take 75 mg by mouth nightly    Historical Provider, MD   loratadine (CLARITIN) 10 MG tablet Take 10 mg by mouth daily    Historical Provider, MD   famotidine (PEPCID) 40 MG tablet Take 40 mg by mouth every evening     Historical Provider, MD   naproxen (NAPROSYN) 500 MG tablet Take 1 tablet by mouth 2 times daily (with meals) AS NEEDED FOR PAIN 3/30/20   DANIEL Ford - CNP   metoclopramide (REGLAN) 10 MG tablet Take 1 tablet by mouth 3 times daily as needed (HEADACHES, NAUSEA) 3/30/20   Gerard Hurd, APRN - CNP   SUMAtriptan (IMITREX) 100 MG tablet Take 1 tablet by mouth once as needed for Migraine  Patient not taking: Reported on 11/30/2021 3/13/16 12/30/21  SVEN Josue   albuterol (PROVENTIL HFA;VENTOLIN HFA) 108 (90 BASE) MCG/ACT inhaler Inhale 2 puffs into the lungs every 6 hours as needed for Wheezing. Historical Provider, MD   NONFORMULARY every evening Birth control     Historical Provider, MD       Current medications:    No current facility-administered medications for this encounter. Current Outpatient Medications   Medication Sig Dispense Refill    Fremanezumab-vfrm (AJOVY) 225 MG/1.5ML SOAJ Inject 225 mg into the skin every 30 days      ketorolac (TORADOL) 10 MG tablet Take 10 mg by mouth every 6 hours as needed      Magnesium Oxide 500 MG TABS Take 500 mg by mouth daily      montelukast (SINGULAIR) 10 MG tablet Take 1 tablet by mouth nightly      Riboflavin (B-2) 100 MG TABS Take 1 tablet by mouth daily      UBRELVY 100 MG TABS Take 1 tablet by mouth as needed If needed repeat after 3 hours. Max dose 3 daily      traMADol (ULTRAM) 50 MG tablet Take 1 tablet by mouth every 6 hours as needed for Pain for up to 7 days. Intended supply: 7 days.  Take lowest dose possible to manage pain 28 tablet 0    Ascorbic Acid (VITAMIN C) 500 MG CHEW Take 500 mg by mouth daily 90 tablet 0    SUMAtriptan (IMITREX) 100 MG tablet Take 100 mg by mouth once as needed for Migraine (Patient not taking: Reported on 11/30/2021)      nortriptyline (PAMELOR) 75 MG capsule Take 75 mg by mouth nightly      loratadine (CLARITIN) 10 MG tablet Take 10 mg by mouth daily      famotidine (PEPCID) 40 MG tablet Take 40 mg by mouth every evening       naproxen (NAPROSYN) 500 MG tablet Take 1 tablet by mouth 2 times daily (with meals) AS NEEDED FOR PAIN 20 tablet 0    metoclopramide (REGLAN) 10 MG tablet Take 1 tablet by mouth 3 times daily as needed (HEADACHES, NAUSEA) 30 tablet 0    SUMAtriptan (IMITREX) 100 MG tablet Take 1 tablet by mouth once as needed for Migraine (Patient not taking: Reported on 11/30/2021) 4 tablet 0    albuterol (PROVENTIL HFA;VENTOLIN HFA) 108 (90 BASE) MCG/ACT inhaler Inhale 2 puffs into the lungs every 6 hours as needed for Wheezing.  NONFORMULARY every evening Birth control          Allergies: Allergies   Allergen Reactions    Latex     Azithromycin        Problem List:    Patient Active Problem List   Diagnosis Code    Closed fracture of right distal radius S52.501A    Migraine with acute onset aura G43. 109    Mild intermittent asthma without complication A21.44       Past Medical History:        Diagnosis Date    Asthma     Migraines        Past Surgical History:        Procedure Laterality Date    SEPTOPLASTY      WISDOM TOOTH EXTRACTION         Social History:    Social History     Tobacco Use    Smoking status: Never Smoker    Smokeless tobacco: Never Used   Substance Use Topics    Alcohol use: No                                Counseling given: Not Answered      Vital Signs (Current):   Vitals:    11/30/21 1225   Weight: 145 lb (65.8 kg)   Height: 5' 2.5\" (1.588 m)                                              BP Readings from Last 3 Encounters:   11/23/21 126/81   03/30/20 111/71   03/13/16 115/86       NPO Status:                                                                                 BMI:   Wt Readings from Last 3 Encounters:   11/30/21 145 lb (65.8 kg)   11/30/21 145 lb (65.8 kg)   03/30/20 140 lb (63.5 kg)     Body mass index is 26.1 kg/m².     CBC:   Lab Results   Component Value Date    WBC 9.6 03/30/2020    RBC 4.64 03/30/2020    HGB 13.6 03/30/2020    HCT 40.4 03/30/2020    MCV 87.1 03/30/2020    RDW 12.7 03/30/2020     03/30/2020       CMP:   Lab Results   Component Value Date     03/30/2020    K 3.8 03/30/2020     03/30/2020    CO2 20 03/30/2020    BUN 5 03/30/2020    CREATININE 0.8 03/30/2020    GFRAA >60 03/30/2020    LABGLOM >60 03/30/2020    GLUCOSE 101 03/30/2020    PROT 7.6 03/30/2020    CALCIUM 9.3 03/30/2020    BILITOT 0.4 03/30/2020    ALKPHOS 60 03/30/2020    AST 18 03/30/2020    ALT 12 03/30/2020       POC Tests: No results for input(s): POCGLU, POCNA, POCK, POCCL, POCBUN, POCHEMO, POCHCT in the last 72 hours. Coags: No results found for: PROTIME, INR, APTT    HCG (If Applicable):   Lab Results   Component Value Date    PREGTESTUR NEGATIVE 03/30/2020        ABGs: No results found for: PHART, PO2ART, ZEL4RVU, YKQ6WZP, BEART, X5VQPTLL     Type & Screen (If Applicable):  No results found for: LABABO, LABRH    Drug/Infectious Status (If Applicable):  No results found for: HIV, HEPCAB    COVID-19 Screening (If Applicable): No results found for: COVID19        Anesthesia Evaluation  Patient summary reviewed  Airway: Mallampati: III  TM distance: >3 FB   Neck ROM: full  Mouth opening: > = 3 FB Dental:      Comment: Dentition intact, denies any, loose teeth. Pulmonary:normal exam  breath sounds clear to auscultation  (+) asthma:                            Cardiovascular:Negative CV ROS  Exercise tolerance: good (>4 METS),           Rhythm: regular  Rate: normal                    Neuro/Psych:   (+) headaches: migraine headaches,             GI/Hepatic/Renal: Neg GI/Hepatic/Renal ROS            Endo/Other: Negative Endo/Other ROS                    Abdominal:             Vascular: Other Findings:           Anesthesia Plan      general     ASA 2     (Patient offered a single shot suprascapular nerve block for post-op pain management but she has declined.)  Induction: intravenous. MIPS: Postoperative opioids intended and Prophylactic antiemetics administered.   Anesthetic plan and risks discussed with patient. Plan discussed with CRNA.                 Elbert Hicks MD   12/1/2021

## 2021-12-03 NOTE — OP NOTE
Operative Note      Patient: Hugo Estrada  YOB: 1995  MRN: [id number]    Date of Procedure: 12/2/2021    Pre-Op Diagnosis: Closed intra-articular right distal radius fracture    Post-Op Diagnosis: Same       Procedure(s):  RIGHT DISTAL RADIUS OPEN REDUCTION INTERNAL FIXATION, intra-articular, greater than 3 fragments    Surgeon(s):  Robson Ruiz MD    Assistant:   Resident: John Lane DO; Eliu Crespo DO    Anesthesia: General    Estimated Blood Loss (mL): Minimal    Complications: None    Specimens:   * No specimens in log *    Implants:  Implant Name Type Inv. Item Serial No.  Lot No. LRB No. Used Action   PLATE BNE [id number] STD 6X3 H ST R DST RAD VOLAR S STL JULIA  PLATE BNE [id number] STD 6X3 H ST R DST RAD VOLAR S STL JULIA  DEPUY [employer]  Right 1 Implanted   SCREW BNE L14MM DIA2.4MM DST RAD VOLAR S STL ST JULIA ANG ANGY  SCREW BNE L14MM DIA2.4MM DST RAD VOLAR S STL ST JULIA ANG ANGY  DEPUY [employer] [place]-  Right 1 Implanted   SCREW BNE L18MM DIA2.4MM DST RAD VOLAR S STL ST JULIA ANG ANGY  SCREW BNE L18MM DIA2.4MM DST RAD VOLAR S STL ST JULIA ANG ANGY  DEPUY [employer]-  Right 1 Implanted   SCREW BNE L18MM DIA2. 4MM LUCERO S STL ST T8 STARDRV RECESS  SCREW BNE L18MM DIA2. 4MM LUCERO S STL ST T8 STARDRV RECESS  DEPUY [employer]-WD  Right 1 Implanted         Drains: * No LDAs found *    Findings: Near-anatomic reduction with a Synthes precontoured distal radius plate    Detailed Description of Procedure:   Patient was brought to the operating room in a supine position on hospital room bed. Patient was then transferred to the operating room table by multiple individuals in a safe fashion with anesthesia and control the patient C-spine and airway. Once in the operating room table all points of pressure were identified and well-padded. A tourniquet was applied to her right upper arm. Her right upper extremity was sterilely prepped and draped in standard orthopedic fashion.   A timeout was performed indicating the appropriate identification of the patient, the procedure to be performed, and the side to be performed upon. This was agreed upon by all individuals in the room. We then marked out a volar approach of the right wrist.  Esmarch was applied tourniquet was elevated to 250 mmHg. A 10 blade was then used to make an incision. Careful dissection was carried out down to the FCR tendon. The FCR tendon was removed from its sheath and retracted radially. We then elevated the pronator quadratus. The fracture was visualized cleaned out and then manually reduced. A K wire was placed percutaneously through the radial styloid to hold the reduction. The Synthes plate was then put in position and held with K wires and checked under fluoroscopy. Once in appropriate position a nonlocking screw was placed distally. An additional nonlocking screw was placed in the radial styloid to compress it. We then placed locking screws distally in the plate. Once these were locked in a bicortical screw was placed in the shaft which help recreate the volar tilt. Additional bicortical screws were then placed in the shaft. The provisional K wires were all removed. Final x-rays showed near-anatomic reduction with hardware in good position. The wrist had in a smooth range of motion. It was then copiously irrigated with sterile normal saline. The tourniquet was let down 0 mmHg and all bleeding was controlled electrocautery. Patient's incision was then closed with 2-0 Monocryl and 3-0 nylon. Bacitracin Xeroform a sterile dressing and a well-padded volar splint were put in position. Patient was reversed of anesthesia without complication and taken to the PACU in stable condition. Postoperative plan:   Nonweightbearing right upper extremity    Keep splint clean dry intact    Ice and elevate    Follow-up in 2 weeks for suture removal, x-rays of the right wrist and most likely transition to a Velcro wrist splint to start range of motion    Call sooner with any questions or concerns    Electronically signed by Ar Terry MD on 12/2/2021 at 7:21 PM

## 2021-12-03 NOTE — PROGRESS NOTES
Discharge instructions provided to patient and  written and verbal, patient and  verbalized understanding. Iv site removed. Her mom Assisted patient in getting dressed. Transport requested.

## 2021-12-03 NOTE — ANESTHESIA POSTPROCEDURE EVALUATION
Department of Anesthesiology  Postprocedure Note    Patient: Eric Lyn  MRN: [id number]  YOB: 1995  Date of evaluation: 12/2/2021  Time:  8:35 PM     Procedure Summary     Date: 12/02/21 Room / Location: [place] [address] / [facility]    Anesthesia Start: 9162 Anesthesia Stop: 1951    Procedure: RIGHT DISTAL RADIUS OPEN REDUCTION INTERNAL FIXATION (Right Wrist) Diagnosis: (RIGHT DISTAL RADIUS FRACTURE)    Surgeons: Michael Snow MD Responsible Provider: Natasha Gaffney MD    Anesthesia Type: general ASA Status: 2          Anesthesia Type: general    Janie Phase I: Janie Score: 10    Janie Phase II:      Last vitals: Reviewed and per EMR flowsheets.        Anesthesia Post Evaluation    Patient location during evaluation: PACU  Patient participation: complete - patient participated  Level of consciousness: awake and alert  Airway patency: patent  Nausea & Vomiting: no nausea and no vomiting  Complications: no  Cardiovascular status: hemodynamically stable  Respiratory status: acceptable  Hydration status: euvolemic

## 2021-12-09 DIAGNOSIS — S52.501A CLOSED FRACTURE OF DISTAL END OF RIGHT RADIUS, UNSPECIFIED FRACTURE MORPHOLOGY, INITIAL ENCOUNTER: Primary | ICD-10-CM

## 2021-12-10 DIAGNOSIS — S52.571A OTHER CLOSED INTRA-ARTICULAR FRACTURE OF DISTAL END OF RIGHT RADIUS, INITIAL ENCOUNTER: ICD-10-CM

## 2021-12-10 RX ORDER — OXYCODONE HYDROCHLORIDE AND ACETAMINOPHEN 5; 325 MG/1; MG/1
1 TABLET ORAL EVERY 6 HOURS PRN
Qty: 28 TABLET | Refills: 0 | Status: SHIPPED | OUTPATIENT
Start: 2021-12-10 | End: 2021-12-17

## 2021-12-10 RX ORDER — METHOCARBAMOL 750 MG/1
750 TABLET, FILM COATED ORAL 3 TIMES DAILY
Qty: 30 TABLET | Refills: 0 | Status: SHIPPED | OUTPATIENT
Start: 2021-12-10 | End: 2021-12-20

## 2021-12-10 RX ORDER — ACETAMINOPHEN 500 MG
1000 TABLET ORAL 2 TIMES DAILY PRN
Qty: 120 TABLET | Refills: 1 | Status: SHIPPED
Start: 2021-12-10 | End: 2022-02-14

## 2021-12-10 NOTE — TELEPHONE ENCOUNTER
meds refilled. Controlled Substance Monitoring:    Acute and Chronic Pain Monitoring:   RX Monitoring 12/10/2021   Periodic Controlled Substance Monitoring No signs of potential drug abuse or diversion identified.

## 2021-12-10 NOTE — TELEPHONE ENCOUNTER
Received call from pt requesting callback. CAll placed to pt, states her fingers are swollen and having trouble moving them. Instructed to ice, elevate, and to take down ACE wrap and re-apply. If symptoms do not improve over weekend to go to ER to have splint change. Pt verbalized understanding, questions answered. Pt also requested refill of pain medication. Date of Procedure: 12/2/2021  Procedure(s):  RIGHT DISTAL RADIUS OPEN REDUCTION INTERNAL FIXATION, intra-articular, greater than 3 fragments    Order pended and routed for decision and signature.

## 2021-12-14 ENCOUNTER — OFFICE VISIT (OUTPATIENT)
Dept: ORTHOPEDIC SURGERY | Age: 26
End: 2021-12-14
Payer: COMMERCIAL

## 2021-12-14 ENCOUNTER — HOSPITAL ENCOUNTER (OUTPATIENT)
Dept: GENERAL RADIOLOGY | Age: 26
Discharge: HOME | End: 2021-12-16
Payer: COMMERCIAL

## 2021-12-14 VITALS — TEMPERATURE: 98 F

## 2021-12-14 DIAGNOSIS — S52.501A CLOSED FRACTURE OF DISTAL END OF RIGHT RADIUS, UNSPECIFIED FRACTURE MORPHOLOGY, INITIAL ENCOUNTER: ICD-10-CM

## 2021-12-14 DIAGNOSIS — S52.571A OTHER CLOSED INTRA-ARTICULAR FRACTURE OF DISTAL END OF RIGHT RADIUS, INITIAL ENCOUNTER: Primary | ICD-10-CM

## 2021-12-14 PROCEDURE — 99212 OFFICE O/P EST SF 10 MIN: CPT | Performed by: PHYSICIAN ASSISTANT

## 2021-12-14 PROCEDURE — 99024 POSTOP FOLLOW-UP VISIT: CPT | Performed by: PHYSICIAN ASSISTANT

## 2021-12-14 PROCEDURE — L3809 WHFO W/O JOINTS PRE OTS: HCPCS | Performed by: PHYSICIAN ASSISTANT

## 2021-12-14 PROCEDURE — 73110 X-RAY EXAM OF WRIST: CPT

## 2021-12-14 NOTE — PATIENT INSTRUCTIONS
Continue nonweightbearing right upper extremity. Patient was placed into a removable right wrist brace today. Okay to remove the brace for hygiene, therapy and when sitting around the house. Otherwise brace should be worn at all times including while sleeping. Occupational Therapy at [facility] starting next week following distal radius protocol at the 3-week vimal. If Steri-Strips do not fall off of your incision in 7 days on their own, okay to remove. Follow-up in 4 weeks for reevaluation and x-rays. Call if any questions or concerns.

## 2021-12-14 NOTE — PROGRESS NOTES
Myriam Khan is a 23 y.o. female who presents for follow up of right distal radius orif    SURGEON: Dr. Liya Kiran MD  Date of Injury/Surgery: 12/02/2021  Date last seen in office: 11/30/2021    Symptoms: unchanged  New complaints: patient complains of numbness and pain since the splint was removed; 7/10 pain    Cast/Splint, Brace, or Dressings: Clean, dry and intact, Well fitting and Taken down for visit    Weightbearing: right upper Non-weight bearing      Assistive device No Device  Participating in therapy (location if yes)?  no    Refills Needed: None  Order/Referral Needed: no

## 2021-12-21 ENCOUNTER — HOSPITAL ENCOUNTER (OUTPATIENT)
Dept: OCCUPATIONAL THERAPY | Age: 26
Setting detail: THERAPIES SERIES
Discharge: HOME | End: 2021-12-21
Payer: COMMERCIAL

## 2021-12-21 PROCEDURE — 97165 OT EVAL LOW COMPLEX 30 MIN: CPT | Performed by: OCCUPATIONAL THERAPIST

## 2021-12-21 PROCEDURE — 97110 THERAPEUTIC EXERCISES: CPT | Performed by: OCCUPATIONAL THERAPIST

## 2021-12-21 PROCEDURE — 97530 THERAPEUTIC ACTIVITIES: CPT | Performed by: OCCUPATIONAL THERAPIST
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
## 2022-01-06 DIAGNOSIS — S52.571A OTHER CLOSED INTRA-ARTICULAR FRACTURE OF DISTAL END OF RIGHT RADIUS, INITIAL ENCOUNTER: Primary | ICD-10-CM

## 2022-01-11 ENCOUNTER — HOSPITAL ENCOUNTER (OUTPATIENT)
Dept: GENERAL RADIOLOGY | Age: 27
Discharge: HOME | End: 2022-01-13
Payer: COMMERCIAL

## 2022-01-11 ENCOUNTER — OFFICE VISIT (OUTPATIENT)
Dept: ORTHOPEDIC SURGERY | Age: 27
End: 2022-01-11
Payer: COMMERCIAL

## 2022-01-11 VITALS — TEMPERATURE: 98.2 F

## 2022-01-11 DIAGNOSIS — S52.571A OTHER CLOSED INTRA-ARTICULAR FRACTURE OF DISTAL END OF RIGHT RADIUS, INITIAL ENCOUNTER: ICD-10-CM

## 2022-01-11 DIAGNOSIS — S52.571D OTHER CLOSED INTRA-ARTICULAR FRACTURE OF DISTAL END OF RIGHT RADIUS WITH ROUTINE HEALING, SUBSEQUENT ENCOUNTER: Primary | ICD-10-CM

## 2022-01-11 PROCEDURE — 99212 OFFICE O/P EST SF 10 MIN: CPT | Performed by: PHYSICIAN ASSISTANT

## 2022-01-11 PROCEDURE — 99024 POSTOP FOLLOW-UP VISIT: CPT | Performed by: PHYSICIAN ASSISTANT

## 2022-01-11 PROCEDURE — 73110 X-RAY EXAM OF WRIST: CPT

## 2022-01-11 NOTE — PROGRESS NOTES
Chief Complaint   Patient presents with    Wrist Pain     right distal radius orif; dos: 12/2/2021; 0/10 pain; patient is wearing her brace most of the day, still sleeps in her brace, has not started therapy yet        OP:SURGEON: Dr. Juan Mcbride MD  DATE OF PROCEDURE: 12-2-21  PROCEDURE: RIGHT DISTAL RADIUS OPEN REDUCTION INTERNAL FIXATION, intra-articular, greater than 3 fragments     POD: 6 weeks    Subjective:  Mack Payne is following up from the above surgery. She is NWB on right upper extremity. She ambulates with no assistive device. Pain to extremity is none and is not taking prescribed pain medication. They denies numbness or tingling to the right upper extremity. Denies calf pain, chest pain, or shortness of breath. Patient is not participating in therapy at this time. She does have some occasional soreness in the wrist which is improving. States that she is going to be starting therapy soon at [facility]. She wears the removable wrist brace when out and about for protection. Review of Systems -  All pertinent positives/negatives per HPI       Objective:    General: Alert and oriented X 3, normocephalic atraumatic, external ears and eye normal, sclera clear, no acute distress, respirations easy and unlabored with no audible wheezes, skin warm and dry, speech and dress appropriate for noted age, affect euthymic.     Extremity:  Right Upper Extremity  Skin is clean dry and intact   no edema noted  2+ Radial pulse, fingers warm with BCR  Flex/extension intact to wrist, thumb and fingers   Finger opposition intact  Finger adduction/abduction intact  Finger crossover intact  able to make concentric fist  Actively, she can flex and extend the wrist approximately 15 degrees in both directions without pain  Subjectively states sensation is intact to light touch over the Median Nerve, Ulnar Nerve and Radial Nerve distribution  Incision well-healed    Temp 98.2 °F (36.8 °C)   LMP 12/23/2021     XR:   3 views right wrist demonstrate ORIF distal radius fracture with the plate and screws in stable position and alignment. No evidence of hardware loosening or failure. Healing noted at the fracture site. Assessment:   Diagnosis Orders   1. Other closed intra-articular fracture of distal end of right radius with routine healing, subsequent encounter       Plan:  X-rays reviewed and discussed. Continue wearing removable wrist brace for protection when out and about. Okay for partial weightbearing right upper extremity 2 to 3 pounds. She is scheduled to start OT. Follow-up in 6 weeks for reevaluation and x-rays. Call if any questions or concerns. Electronically signed by SVEN Astudillo on 1/11/2022 at 10:09 AM  Note: This report was completed using [email or web address] voiced recognition software. Every effort has been made to ensure accuracy; however, inadvertent computerized transcription errors may be present.

## 2022-01-11 NOTE — PROGRESS NOTES
Main Llamas is a 23 y.o. female who presents for follow up of right distal radius orif    SURGEON: Dr. Ariel Ayers MD  Date of Injury/Surgery: 12/02/2021  Date last seen in office: 12/14/2021    Symptoms: better  New complaints: patient states she is better than her last appointment but has very decreased range of motion; has not started PT yet; will be starting soon    Cast/Splint, Brace, or Dressings: Clean, dry and intact, Well fitting and Taken down for visit    Weightbearing: right upper Partial weight bearing      Assistive device Wrist/Thumb spica velcro brace  Participating in therapy (location if yes)?  no    Refills Needed: None  Order/Referral Needed: no

## 2022-01-12 ENCOUNTER — HOSPITAL ENCOUNTER (OUTPATIENT)
Dept: OCCUPATIONAL THERAPY | Age: 27
Setting detail: THERAPIES SERIES
Discharge: HOME | End: 2022-01-12
Payer: COMMERCIAL

## 2022-01-12 PROCEDURE — 97022 WHIRLPOOL THERAPY: CPT

## 2022-01-12 PROCEDURE — 97140 MANUAL THERAPY 1/> REGIONS: CPT

## 2022-01-12 PROCEDURE — 97110 THERAPEUTIC EXERCISES: CPT

## 2022-01-12 NOTE — PROGRESS NOTES
[facility] THERAPY PROGRESS NOTE    [facility]  [address] [facility]  [place]   Phone: [phone]   Fax: [phone]     Date:  2022    Initial Evaluation Date: 2021   Evaluating Therapist: KANDY Burgess    Patient Name:  Alejandra North    :  1995    Restrictions/Precautions: Per Distal Radius ORIF Protocol; Allergy: latex  Diagnosis:  R Distal Radius ORIF; S52.571A (ICD-10-CM) - Other closed intra-articular fracture of distal end of right radius, initial encounter   Date of Surgery/Injury: 2021 sx     [place] [facility] [place] for 18 visits  Plan of care signed (Y/N): Y - cosigned  Visit# / total visits: -18     Referring Practitioner:  SVEN Singh  Specific Practitioner Orders: Nonweightbearing right upper extremity. Follow distal radius protocol starting at the 3-week vimal. Assessment of current deficits   [x] ADLs          [x] Strength     [x] IADLs     [x] Endurance     [x] ROM     [x] Pain                   [x] FM Coordination    [x] Sensation   [x] Scar Adhesion/Skin Integrity   [x] Edema      OT PLAN OF CARE   OT POC based on physician orders, patient diagnosis and results of clinical assessment.      Frequency/Duration: 2-3x/wk for 18 visits  /  Certification Period From: 2021  To: 3/21/2022     Specific OT Treatment to include:   [x] Instruction in HEP                   Modalities:  [x] Therapeutic Exercise               [x] Ultrasound           [x] PROM/Stretching                   [x] Fluidotherapy                           [x] AAROM  [x] AROM                 [x]  Paraffin      [x] Tendon Glides                        [x] ADL/IADL re-training    [x] Therapeutic Activity                [x] Pain Management with/without modalities PRN                 [x] Manual Therapy                     [x] Splinting [x] Scar Management                 [x]Joint Protection/Training  [x]Ergonomics                             [x] Joint Mobilization                    [x] Manual Edema Mobilization   [x] Myofascial Release                [x] Desensitization    [x] GM/FM Coordination              [x] Safety retraining/education per  individual diagnosis/goals     Patient Specific Goal: To eliminate fear of moving R hand and get it back to it's normal use. GOALS (Long term same as Short term):  1) Patient will demonstrate good understanding of home program (exercises/activities/diagnosis/prognosis/goals) with good accuracy. 2) Patient will demonstrate increased active/passive range of motion of their R wrist/digits to [facility] for ADL/IADL completion. 3) Patient will demonstrate increased /pinch strength of at least 10 / 2-5 pinch pounds of their R hand when appropriate. 4) Patient to report decreased pain in their affected R upper extremity from 6-7/10 to 2/10 or less with resistive functional use. 5) Increase in fine motor function as evidenced by decreased time to complete 9-hole peg test and/or MRMT test by at least 25 seconds. 6) Patient to report 100% compliance with their splint wear, care, and precautions if needed. 7) Patient will be knowledgeable of edema control techniques as evident with decreases from moderate to mild/none. 8) Patient will demonstrate a non-tender/non-adherent scar. 9) Patient will report ADL/IADL functions as Mod I/I using R dominant UE.   10) Patient will demonstrate improved functional activity tolerance from poor+ to good for ADL/IADL completion. 11) Patient will decrease QuickDASH score to 25% or less for increased participation in daily functional activities. TODAY'S TREATMENT     Pain Level: moderate, aching, throbbing, tight (pulling) and uncomfortable    SUBJECTIVE: Patient seen 1 scheduled visits.   States; \"I can now move my jewels  [] Demonstrates/verbalizes HEP/Ed previously given. PLAN:   [x]  Continues Plan of care: Treatment delivered based on POC and graduated to patient's progress. Patient education continues at each visit to obtain maximum benefit from skilled OT intervention.   []  Alter Plan of care:   []  Discharge:    Billing:    TIME IN: [facility] 94: 445 TOTAL TREATMENT TIME: 55 TOTAL TIME: 60  CODE  TODAY MINUTES TODAY UNITS   34855 Fluidotherapy 15 1   26509 Manual 25 2   55522 Therapeutic Ex 20 1   45220 Therapeutic Activity     68754 ADL/COMP Tech Train     95267 Neuromuscular Re-Ed     58826 [id number]     55932 Paraffin     08409 E-Stim     88300 Ultrasound                                    NANCY Titus,  SAUL/L, [address] [place] [address], [place], OTR/L #[id number]

## 2022-01-17 ENCOUNTER — HOSPITAL ENCOUNTER (OUTPATIENT)
Dept: OCCUPATIONAL THERAPY | Age: 27
Setting detail: THERAPIES SERIES
Discharge: HOME | End: 2022-01-17
Payer: COMMERCIAL
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
## 2022-01-19 ENCOUNTER — HOSPITAL ENCOUNTER (OUTPATIENT)
Dept: OCCUPATIONAL THERAPY | Age: 27
Setting detail: THERAPIES SERIES
Discharge: HOME | End: 2022-01-19
Payer: COMMERCIAL

## 2022-01-19 PROCEDURE — 97140 MANUAL THERAPY 1/> REGIONS: CPT

## 2022-01-19 PROCEDURE — 97022 WHIRLPOOL THERAPY: CPT

## 2022-01-19 PROCEDURE — 97110 THERAPEUTIC EXERCISES: CPT

## 2022-01-19 NOTE — PROGRESS NOTES
Jitendra 30 THERAPY PROGRESS NOTE    [facility]  [address] [facility]  [place] Lopez   Phone: [phone]   Fax: [phone]     Date:  2022    Initial Evaluation Date: 2021   Evaluating Therapist: KANDY Triana    Patient Name:  Jag April    :  1995    Restrictions/Precautions: Per Distal Radius ORIF Protocol; Allergy: latex  Diagnosis:  R Distal Radius ORIF; S52.571A (ICD-10-CM) - Other closed intra-articular fracture of distal end of right radius, initial encounter   Date of Surgery/Injury: 2021 sx     Insurance/Certification information: [employer] - Fully approved for 18 visits  Plan of care signed (Y/N): Y - cosigned  Visit# / total visits: 3 / 12-18     Referring Practitioner:  SVEN Camejo  Specific Practitioner Orders: Nonweightbearing right upper extremity. Follow distal radius protocol starting at the 3-week vimal. Assessment of current deficits   [x] ADLs          [x] Strength     [x] IADLs     [x] Endurance     [x] ROM     [x] Pain                   [x] FM Coordination    [x] Sensation   [x] Scar Adhesion/Skin Integrity   [x] Edema      OT PLAN OF CARE   OT POC based on physician orders, patient diagnosis and results of clinical assessment.      Frequency/Duration: 2-3x/wk for 18 visits  /  Certification Period From: 2021  To: 3/21/2022     Specific OT Treatment to include:   [x] Instruction in HEP                   Modalities:  [x] Therapeutic Exercise               [x] Ultrasound           [x] PROM/Stretching                   [x] Fluidotherapy                           [x] AAROM  [x] AROM                 [x]  Paraffin      [x] Tendon Glides                        [x] ADL/IADL re-training    [x] Therapeutic Activity                [x] Pain Management with/without modalities PRN                 [x] Manual Therapy                     [x] Splinting [x] Scar Management                 [x]Joint Protection/Training  [x]Ergonomics                             [x] Joint Mobilization                    [x] Manual Edema Mobilization   [x] Myofascial Release                [x] Desensitization    [x] GM/FM Coordination              [x] Safety retraining/education per  individual diagnosis/goals     Patient Specific Goal: To eliminate fear of moving R hand and get it back to it's normal use. GOALS (Long term same as Short term):  1) Patient will demonstrate good understanding of home program (exercises/activities/diagnosis/prognosis/goals) with good accuracy. 2) Patient will demonstrate increased active/passive range of motion of their R wrist/digits to [facility] for ADL/IADL completion. 3) Patient will demonstrate increased /pinch strength of at least 10 / 2-5 pinch pounds of their R hand when appropriate. 4) Patient to report decreased pain in their affected R upper extremity from 6-7/10 to 2/10 or less with resistive functional use. 5) Increase in fine motor function as evidenced by decreased time to complete 9-hole peg test and/or MRMT test by at least 25 seconds. 6) Patient to report 100% compliance with their splint wear, care, and precautions if needed. 7) Patient will be knowledgeable of edema control techniques as evident with decreases from moderate to mild/none. 8) Patient will demonstrate a non-tender/non-adherent scar. 9) Patient will report ADL/IADL functions as Mod I/I using R dominant UE.   10) Patient will demonstrate improved functional activity tolerance from poor+ to good for ADL/IADL completion. 11) Patient will decrease QuickDASH score to 25% or less for increased participation in daily functional activities. TODAY'S TREATMENT     Pain Level: moderate, aching, throbbing, tight (pulling) and uncomfortable    SUBJECTIVE: Patient seen 1 scheduled visits.   States; \"I have a lot of trouble turning the key in the car. \"     OBJECTIVE:  Engaged patient in the following with focus on ROM, EDEMA, strengthening per protocol, patient education. Reassessment at or by 10th visit. INTERVENTION: DONE  SPECIFICS/COMMENTS:   Modality:     Fluidotherapy  x *Affected UE: Completes x 15 min to promote tissue healing, skin desensitization, reduce inflammation, and decrease pain. Actively completed SROM in all available planes with holds at end range during treatment. Instructed patient in fabrication of rice bag to continue heat prior to stretches   UltraSound     E-Stim     PARAFFIN     Scar Mass/Edema Control:     Scar Management x Scar management with patient education and scar pad issued   Edema Control x Manual edema mobilization to hand/forearm   PROM/Stretching:     forearm x Light stretch/PROM to forearm included in MFR massage to soft tissue  Achieved 60' supination today   Wrist x Volar glide with strap assist, followed by neuromuscular juan ROM    AROM/AAROM:     Wrist x Completed both AROM and AAROM to wrist extensors and flexors, handout provided for HEP previously,    Forearm x Completed both AROM and AAROM to supinators and pronators with handout provided and instructions for HEP inclusion. Therapeutic Activity:     Wristosizer x 5 reps closed chain        Strengthening:               Other:     HEP x Throughout Session with instructions and handouts as needed        ASSESSMENT: Patient shows potential to progress with stated goals and will be educated on HEP and provided written handouts as needed throughout their care. Issued scar pad. Provided written HEP to advance patient with ROM for flexion/ex, pro/sup movements with both AROM and AAROM previously. CC is ulnar border pain with certain movement. Reporting unable to turn key in car yet. 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 Pt is making Fair + progress toward stated plan of care.  -Rehab Potential: Good -Requires OT Follow Up: Yes    Pt. Education:  [x] Yes  [] No  [x] Reviewed Prior HEP/Ed  Method of Education: [x] Verbal  [x] Demo  [] Written  Comprehension of Education:  [x] Verbalizes understanding. [] Demonstrates understanding. [x] Needs review at next sesion  [] Demonstrates/verbalizes HEP/Ed previously given. PLAN:   [x]  Continues Plan of care: Treatment delivered based on POC and graduated to patient's progress. Patient education continues at each visit to obtain maximum benefit from skilled OT intervention.   []  Alter Plan of care:   []  Discharge:    Billing:    TIME IN: [facility] 94: 445 TOTAL TREATMENT TIME: 55 TOTAL TIME: 60  CODE  TODAY MINUTES TODAY UNITS   57410 Fluidotherapy 15 1   90645 Manual 25 2   45275 Therapeutic Ex 20 1   03802 Therapeutic Activity     03588 ADL/COMP Tech Train     [id number] Neuromuscular Re-Ed     04876 [id number]     53275 Paraffin     38751 E-Stim     13282 Ultrasound                                    NANCY Loo,  DORYS/L, [id number]

## 2022-01-20 ENCOUNTER — HOSPITAL ENCOUNTER (OUTPATIENT)
Dept: OCCUPATIONAL THERAPY | Age: 27
Setting detail: THERAPIES SERIES
Discharge: HOME | End: 2022-01-20
Payer: COMMERCIAL

## 2022-01-20 NOTE — PROGRESS NOTES
[address] [facility]    Outpatient Occupational Therapy    Phone: [phone]   Fax: [phone]     Cancellation/No-show Note    Date:  2022    Patient Name:  Ti Tinajero    :  1995     PT ID: [id number]    Total missed visits including today: 1    Total number of no shows: 0    For today's appointment patient:    [x]  Cancelled & Rescheduled appointment  []  No-show     Reason given by patient:  []  Patient ill  []  Conflicting appointment  []  No transportation    [x]  Conflict with work  []  No reason given  []  Other:       Comments:      Electronically signed by:  NANCY Topete COTA/SAKINA  [id number]

## 2022-01-24 NOTE — PROGRESS NOTES
[address] [facility]    Outpatient Occupational Therapy    Phone: [phone]   Fax: [phone]     Cancellation/No-show Note    Date:  2022    Patient Name:  Amarilis Brown    :  1995     PT ID: [id number]    Total missed visits including today: 2   Total number of no shows: 0    For today's appointment patient:    [x]  Cancelled & Rescheduled appointment  []  No-show     Reason given by patient:  []  Patient ill  []  Conflicting appointment  []  No transportation    []  Conflict with work  []  No reason given  [x]  Other:       Comments:  Rescheduled due to snow storm    Electronically signed by:  NANCY Toledo, DORYS/L  [address]

## 2022-01-25 ENCOUNTER — HOSPITAL ENCOUNTER (OUTPATIENT)
Dept: OCCUPATIONAL THERAPY | Age: 27
Setting detail: THERAPIES SERIES
Discharge: HOME | End: 2022-01-25
Payer: COMMERCIAL

## 2022-01-25 PROCEDURE — 97110 THERAPEUTIC EXERCISES: CPT

## 2022-01-25 PROCEDURE — 97140 MANUAL THERAPY 1/> REGIONS: CPT

## 2022-01-25 PROCEDURE — 97022 WHIRLPOOL THERAPY: CPT

## 2022-01-25 NOTE — PROGRESS NOTES
Jitendra 30 THERAPY PROGRESS NOTE    [facility]  [address] [facility]  [place], [address]   Phone: [phone]   Fax: [phone]     Date:  2022    Initial Evaluation Date: 2021   Evaluating Therapist: KANDY Guzman    Patient Name:  Esperanza Hamilton    :  1995    Restrictions/Precautions: Per Distal Radius ORIF Protocol; Allergy: latex  Diagnosis:  R Distal Radius ORIF; S52.571A (ICD-10-CM) - Other closed intra-articular fracture of distal end of right radius, initial encounter   Date of Surgery/Injury: 2021 sx     Ins[facility]Certification information[facility] Fully approved for 18 visits  Plan of care signed (Y/N): Y - cosigned  Visit# / total visits: -18     Referring Practitioner:  SVEN Sam  Specific Practitioner Orders: Nonweightbearing right upper extremity. Follow distal radius protocol starting at the 3-week vimal. Assessment of current deficits   [x] ADLs          [x] Strength     [x] IADLs     [x] Endurance     [x] ROM     [x] Pain                   [x] FM Coordination    [x] Sensation   [x] Scar Adhesion/Skin Integrity   [x] Edema      OT PLAN OF CARE   OT POC based on physician orders, patient diagnosis and results of clinical assessment.      Frequency/Duration: 2-3x/wk for 18 visits  /  Certification Period From: 2021  To: 3/21/2022     Specific OT Treatment to include:   [x] Instruction in HEP                   Modalities:  [x] Therapeutic Exercise               [x] Ultrasound           [x] PROM/Stretching                   [x] Fluidotherapy                           [x] AAROM  [x] AROM                 [x]  Paraffin      [x] Tendon Glides                        [x] ADL/IADL re-training    [x] Therapeutic Activity                [x] Pain Management with/without modalities PRN                 [x] Manual Therapy                     [x] Splinting [x] Scar Management                 [x]Joint Protection/Training  [x]Ergonomics                             [x] Joint Mobilization                    [x] Manual Edema Mobilization   [x] Myofascial Release                [x] Desensitization    [x] GM/FM Coordination              [x] Safety retraining/education per  individual diagnosis/goals     Patient Specific Goal: To eliminate fear of moving R hand and get it back to it's normal use. Progressing: was able to braid her hair. Feels most of her motion is back. GOALS (Long term same as Short term):  1) Patient will demonstrate good understanding of home program (exercises/activities/diagnosis/prognosis/goals) with good accuracy. 2) Patient will demonstrate increased active/passive range of motion of their R wrist/digits to [facility] for ADL/IADL completion. 3) Patient will demonstrate increased /pinch strength of at least 10 / 2-5 pinch pounds of their R hand when appropriate. 4) Patient to report decreased pain in their affected R upper extremity from 6-7/10 to 2/10 or less with resistive functional use. 5) Increase in fine motor function as evidenced by decreased time to complete 9-hole peg test and/or MRMT test by at least 25 seconds. 6) Patient to report 100% compliance with their splint wear, care, and precautions if needed. 7) Patient will be knowledgeable of edema control techniques as evident with decreases from moderate to mild/none. 8) Patient will demonstrate a non-tender/non-adherent scar. 9) Patient will report ADL/IADL functions as Mod I/I using R dominant UE.   10) Patient will demonstrate improved functional activity tolerance from poor+ to good for ADL/IADL completion. 11) Patient will decrease QuickDASH score to 25% or less for increased participation in daily functional activities.         TODAY'S TREATMENT     Pain Level: moderate, aching, throbbing, tight (pulling) and uncomfortable    SUBJECTIVE: Patient seen 1/ 2  scheduled visits. States; \"I was able to turn the key in the car today. \"     OBJECTIVE:  Engaged patient in the following with focus on ROM, EDEMA, strengthening per protocol, patient education. Reassessment at or by 10th visit. INTERVENTION: DONE  SPECIFICS/COMMENTS:   Modality:     Fluidotherapy  x *Affected UE: Completes x 15 min to promote tissue healing, skin desensitization, reduce inflammation, and decrease pain. Actively completed SROM in all available planes with holds at end range during treatment. Instructed patient in fabrication of rice bag to continue heat prior to stretches   UltraSound     E-Stim     PARAFFIN     Scar Mass/Edema Control:     Scar Management x Scar management with patient education and scar pad issued   Edema Control x Manual edema mobilization to hand/forearm   PROM/Stretching:     forearm x Light stretch/PROM to forearm included in MFR massage to soft tissue  Achieved 60' supination today   Wrist x Volar glide with strap assist, followed by neuromuscular juan ROM    AROM/AAROM:     Wrist x Completed both AROM and AAROM to wrist extensors and flexors, handout provided for HEP previously,    Forearm x Completed both AROM and AAROM to supinators and pronators with handout provided and instructions for HEP inclusion. Therapeutic Activity:     Wristosizer x 5 reps closed chain        Strengthening:     Wrist x Light strengthening with 2# wt for flexion/extension with no pain. Fatigue was high. Other:     HEP x Throughout Session with instructions and handouts as needed        ASSESSMENT: Patient shows potential to progress with stated goals and will be educated on HEP and provided written handouts as needed throughout their care. Issued scar pad previously. Focus on pronation/supination stretch today. CC was little finger discomfort over MP joint. No evidence of dislocation noted.   Advised patient to use heat and light massage to area of concern at this time. Pt is making Fair + progress toward stated plan of care. -Rehab Potential: Good -Requires OT Follow Up: Yes    Pt. Education:  [x] Yes  [] No  [x] Reviewed Prior HEP/Ed  Method of Education: [x] Verbal  [x] Demo  [] Written  Comprehension of Education:  [x] Verbalizes understanding. [] Demonstrates understanding. [x] Needs review at next sesion  [] Demonstrates/verbalizes HEP/Ed previously given. PLAN:   [x]  Continues Plan of care: Treatment delivered based on POC and graduated to patient's progress. Patient education continues at each visit to obtain maximum benefit from skilled OT intervention.   []  Alter Plan of care:   []  Discharge:    Billing:    TIME IN: [facility] 94: 445 TOTAL TREATMENT TIME: 55 TOTAL TIME: 60  CODE  TODAY MINUTES TODAY UNITS   23718 Fluidotherapy 10 1   84356 Manual 25 2   99032 Therapeutic Ex 20 1   51739 Therapeutic Activity     40031 ADL/COMP Tech Train     [id number] Neuromuscular Re-Ed     44850 [id number]     33284 Paraffin     15973 E-Stim     07356 Ultrasound                                    Destiny Sovereign, NANCY,  SAUL/L, [id number]

## 2022-01-26 ENCOUNTER — HOSPITAL ENCOUNTER (OUTPATIENT)
Dept: OCCUPATIONAL THERAPY | Age: 27
Setting detail: THERAPIES SERIES
Discharge: HOME | End: 2022-01-26
Payer: COMMERCIAL

## 2022-01-26 PROCEDURE — 97110 THERAPEUTIC EXERCISES: CPT

## 2022-01-26 PROCEDURE — 97530 THERAPEUTIC ACTIVITIES: CPT

## 2022-01-26 PROCEDURE — 97140 MANUAL THERAPY 1/> REGIONS: CPT

## 2022-01-26 NOTE — PROGRESS NOTES
Jitendra 30 THERAPY PROGRESS NOTE    [facility]  [address] [facility]  [place]   Phone: [phone]   Fax: [phone]     Date:  2022    Initial Evaluation Date: 2021   Evaluating Therapist: KANDY Mendenhall    Patient Name:  Agata Anderson    :  1995    Restrictions/Precautions: Per Distal Radius ORIF Protocol; Allergy: latex  Diagnosis:  R Distal Radius ORIF; S52.571A (ICD-10-CM) - Other closed intra-articular fracture of distal end of right radius, initial encounter   Date of Surgery/Injury: 2021 sx  Referring Practitioner:  SVEN Hanson  Specific Practitioner Orders: Nonweightbearing right upper extremity. Follow distal radius protocol starting at the 3-week vimal. Insurance/Certification information: [employer] - Fully approved for 18 visits  Plan of care signed (Y/N): Y - cosigned  Visit# / total visits: -18     Assessment of current deficits   [x] ADLs          [x] Strength     [x] IADLs     [x] Endurance     [x] ROM     [x] Pain                   [x] FM Coordination    [x] Sensation   [x] Scar Adhesion/Skin Integrity   [x] Edema      OT PLAN OF CARE   OT POC based on physician orders, patient diagnosis and results of clinical assessment.      Frequency/Duration: 2-3x/wk for 18 visits  /  Certification Period From: 2021  To: 3/21/2022     Specific OT Treatment to include:   [x] Instruction in HEP                   Modalities:  [x] Therapeutic Exercise               [x] Ultrasound           [x] PROM/Stretching                   [x] Fluidotherapy                           [x] AAROM  [x] AROM                 [x]  Paraffin      [x] Tendon Glides                        [x] ADL/IADL re-training    [x] Therapeutic Activity                [x] Pain Management with/without modalities PRN                 [x] Manual Therapy                     [x] Splinting [x] Scar Management                 [x]Joint Protection/Training  [x]Ergonomics                             [x] Joint Mobilization                    [x] Manual Edema Mobilization   [x] Myofascial Release                [x] Desensitization    [x] GM/FM Coordination              [x] Safety retraining/education per  individual diagnosis/goals     Patient Specific Goal: To eliminate fear of moving R hand and get it back to it's normal use. Progressing: was able to braid her hair. Feels most of her motion is back. GOALS (Long term same as Short term):  1) Patient will demonstrate good understanding of home program (exercises/activities/diagnosis/prognosis/goals) with good accuracy. 2) Patient will demonstrate increased active/passive range of motion of their R wrist/digits to [facility] for ADL/IADL completion. 3) Patient will demonstrate increased /pinch strength of at least 10 / 2-5 pinch pounds of their R hand when appropriate. 4) Patient to report decreased pain in their affected R upper extremity from 6-7/10 to 2/10 or less with resistive functional use. 5) Increase in fine motor function as evidenced by decreased time to complete 9-hole peg test and/or MRMT test by at least 25 seconds. 6) Patient to report 100% compliance with their splint wear, care, and precautions if needed. 7) Patient will be knowledgeable of edema control techniques as evident with decreases from moderate to mild/none. 8) Patient will demonstrate a non-tender/non-adherent scar. 9) Patient will report ADL/IADL functions as Mod I/I using R dominant UE.   10) Patient will demonstrate improved functional activity tolerance from poor+ to good for ADL/IADL completion. 11) Patient will decrease QuickDASH score to 25% or less for increased participation in daily functional activities.         TODAY'S TREATMENT     Pain Level: moderate, aching, throbbing, tight (pulling) and uncomfortable    SUBJECTIVE: Patient seen 2 / 2  scheduled visits. States; \"My little finger feels less painful and more rested. \"     OBJECTIVE:  Engaged patient in the following with focus on ROM, EDEMA, strengthening per protocol, patient education. INTERVENTION: DONE  SPECIFICS/COMMENTS:   Modality:     Fluidotherapy  x *Affected UE: Completes x 15 min to promote tissue healing, skin desensitization, reduce inflammation, and decrease pain. Actively completed SROM in all available planes with holds at end range during treatment. Instructed patient in fabrication of rice bag to continue heat prior to stretches   UltraSound     E-Stim     PARAFFIN     Scar Mass/Edema Control:     Scar Management x Scar management with patient education and scar pad issued   Edema Control x Manual edema mobilization to hand/forearm   PROM/Stretching:     forearm x Light stretch/PROM to forearm included in MFR massage to soft tissue  Achieved 60' supination today   Wrist x Volar glide with strap assist, followed by neuromuscular juan ROM    AROM/AAROM:     Wrist x Completed both AROM and AAROM to wrist extensors and flexors, handout provided for HEP previously,    Forearm x Completed both AROM and AAROM to supinators and pronators with handout provided and instructions for HEP inclusion. Therapeutic Activity:     Wristosizer x 5 reps closed chain   UBE x To improve motor control/coordination and motor endurance of affected UE, scapular mobility/stability challenge with speed/resistance change ups as increased function builds and improvements noted   Strengthening:     Wrist x Light strengthening with 2# wt for flexion/extension with no pain. Fatigue was high.      Forearm pro/sup x 2#  10 x 2   Other:     HEP x Throughout Session with instructions and handouts as needed        ASSESSMENT: Patient shows potential to progress with stated goals and will be educated on HEP and provided written handouts as needed throughout their care. Issued scar pad previously. Focus on pronation/supination stretch today. CC was little finger discomfort over MP joint. No evidence of dislocation noted. Advised patient to use heat and light massage to area of concern at this time. Reapplied kinesio tape for little finger rest and discomfort ligament pain. Pt is making Fair + progress toward stated plan of care. -Rehab Potential: Good -Requires OT Follow Up: Yes    Pt. Education:  [x] Yes  [] No  [x] Reviewed Prior HEP/Ed  Method of Education: [x] Verbal  [x] Demo  [] Written  Comprehension of Education:  [x] Verbalizes understanding. [] Demonstrates understanding. [x] Needs review at next sesion  [] Demonstrates/verbalizes HEP/Ed previously given. PLAN:   [x]  Continues Plan of care: Treatment delivered based on POC and graduated to patient's progress. Patient education continues at each visit to obtain maximum benefit from skilled OT intervention.   []  Alter Plan of care:   []  Discharge:    Billing:    TIME IN: [facility] 94: 445 TOTAL TREATMENT TIME: 55 TOTAL TIME: 60  CODE  TODAY MINUTES TODAY UNITS   48477 Fluidotherapy 10 1   08008 Manual     95187 Therapeutic Ex 30 2   82182 Therapeutic Activity 15 1   29899 ADL/COMP Tech Train     [id number] Neuromuscular Re-Ed     97509 [id number]     82148 Paraffin     80633 E-Stim     50613 Ultrasound                                    NANCY Farnsworth COTA/SAKINA, [id number]

## 2022-02-01 ENCOUNTER — HOSPITAL ENCOUNTER (OUTPATIENT)
Dept: OCCUPATIONAL THERAPY | Age: 27
Setting detail: THERAPIES SERIES
Discharge: HOME | End: 2022-02-01
Payer: COMMERCIAL

## 2022-02-01 NOTE — PROGRESS NOTES
[facility]    Outpatient Occupational Therapy    Phone: [phone]   Fax: [phone]     Cancellation/No-show Note    Date:  2022    Patient Name:  Ny Caro    :  1995     PT ID: [id number]    Total missed visits including today: 3   Total number of no shows: 1    For today's appointment patient:    []  Cancelled & Rescheduled appointment  [x]  No-show     Reason given by patient:  []  Patient ill  []  Conflicting appointment  []  No transportation    []  Conflict with work  [x]  No reason given  []  Other:       Comments:      Electronically signed by:  NANCY Bonner COTA/SAKINA  [address]

## 2022-02-02 ENCOUNTER — HOSPITAL ENCOUNTER (OUTPATIENT)
Dept: OCCUPATIONAL THERAPY | Age: 27
Setting detail: THERAPIES SERIES
Discharge: HOME | End: 2022-02-02
Payer: COMMERCIAL

## 2022-02-02 PROCEDURE — 97110 THERAPEUTIC EXERCISES: CPT

## 2022-02-02 PROCEDURE — 97022 WHIRLPOOL THERAPY: CPT

## 2022-02-02 PROCEDURE — 97140 MANUAL THERAPY 1/> REGIONS: CPT

## 2022-02-02 NOTE — PROGRESS NOTES
Jitendra 30 THERAPY PROGRESS NOTE    [facility]  [address] [facility]  [place]   Phone: [phone]   Fax: [phone]     Date:  2022    Initial Evaluation Date: 2021   Evaluating Therapist: KANDY Youngblood    Patient Name:  Melly Cherry    :  1995    Restrictions/Precautions: Per Distal Radius ORIF Protocol; Allergy: latex  Diagnosis:  R Distal Radius ORIF; S52.571A (ICD-10-CM) - Other closed intra-articular fracture of distal end of right radius, initial encounter   Date of Surgery/Injury: 2021 sx  Referring Practitioner:  SVEN Penn  Specific Practitioner Orders: Nonweightbearing right upper extremity. Follow distal radius protocol starting at the 3-week vimal. Insurance/Certification information: [facility] - Fully approved for 18 visits Auth #: [id number]  Plan of care signed (Y/N): Y - cosigned  Visit# / total visits: -18     Assessment of current deficits   [x] ADLs          [x] Strength     [x] IADLs     [x] Endurance     [x] ROM     [x] Pain                   [x] FM Coordination    [x] Sensation   [x] Scar Adhesion/Skin Integrity   [x] Edema      OT PLAN OF CARE   OT POC based on physician orders, patient diagnosis and results of clinical assessment.      Frequency/Duration: 2-3x/wk for 18 visits  /  Certification Period From: 2021  To: 3/21/2022     Specific OT Treatment to include:   [x] Instruction in HEP                   Modalities:  [x] Therapeutic Exercise               [x] Ultrasound           [x] PROM/Stretching                   [x] Fluidotherapy                           [x] AAROM  [x] AROM                 [x]  Paraffin      [x] Tendon Glides                        [x] ADL/IADL re-training    [x] Therapeutic Activity                [x] Pain Management with/without modalities PRN                 [x] Manual Therapy                     [x] Splinting and uncomfortable    SUBJECTIVE: Patient seen 1 / 2  scheduled visits. States; \"My pain is over the back of my hand lately. \"     OBJECTIVE:  Engaged patient in the following with focus on ROM, EDEMA, strengthening per protocol, patient education. INTERVENTION: DONE  SPECIFICS/COMMENTS:   Modality:     Fluidotherapy  x *Affected UE: Completes x 15 min to promote tissue healing, skin desensitization, reduce inflammation, and decrease pain. Actively completed SROM in all available planes with holds at end range during treatment. Instructed patient in fabrication of rice bag to continue heat prior to stretches   UltraSound     E-Stim     PARAFFIN     Scar Mass/Edema Control:     Scar Management  Scar management with patient education and scar pad issued   Edema Control x Manual edema mobilization to hand/forearm and soft tissue massage   PROM/Stretching:     forearm x Light stretch/PROM to forearm included in MFR massage to soft tissue  Achieved 60' supination today   Wrist x Volar glide with strap assist, followed by neuromuscular juan ROM    AROM/AAROM:     Wrist x Completed both AROM and AAROM to wrist extensors and flexors, handout provided for HEP previously,    Forearm x Completed both AROM and AAROM to supinators and pronators with handout provided and instructions for HEP inclusion. Therapeutic Activity:     Wristosizer x 5 reps closed chain   UBE  To improve motor control/coordination and motor endurance of affected UE, scapular mobility/stability challenge with speed/resistance change ups as increased function builds and improvements noted   Strengthening:     Wrist x Light strengthening with 2# wt for flexion/extension with no pain. Fatigue was high.      Forearm pro/sup x 2#  10    Other:     HEP x Throughout Session with instructions and handouts as needed   Kinesio taspe x To dorsal surface of hand for pain discomfort with wear/care instructions     ASSESSMENT: Patient shows potential to progress with stated goals and will be educated on HEP and provided written handouts as needed throughout their care. Issued scar pad previously. Focus on pronation/supination stretch today. CC was dorsal of wrist pain. Applied Kinesio tape with. No evidence of dislocation noted. Advised patient to use heat and light massage to area of concern at this time. Pt is making Fair + progress toward stated plan of care. -Rehab Potential: Good -Requires OT Follow Up: Yes    Pt. Education:  [x] Yes  [] No  [x] Reviewed Prior HEP/Ed  Method of Education: [x] Verbal  [x] Demo  [] Written  Comprehension of Education:  [x] Verbalizes understanding. [] Demonstrates understanding. [x] Needs review at next sesion  [] Demonstrates/verbalizes HEP/Ed previously given. PLAN:   [x]  Continues Plan of care: Treatment delivered based on POC and graduated to patient's progress. Patient education continues at each visit to obtain maximum benefit from skilled OT intervention.   []  Alter Plan of care:   []  Discharge:    Billing:    TIME IN: [facility] 94: 445 TOTAL TREATMENT TIME: 55 TOTAL TIME: 60  CODE  TODAY MINUTES TODAY UNITS   32653 Fluidotherapy 10 1   10509 Manual     10791 Therapeutic Ex 30 2   43603 Therapeutic Activity 15 1   42308 ADL/COMP Tech Train     [id number] Neuromuscular Re-Ed     82562 [id number]     61163 Paraffin     35943 E-Stim     87097 Ultrasound                                    NANCY Smith,  SAUL/L, 23 Zoila Song MS, OTR/L #[id number]

## 2022-02-07 ENCOUNTER — HOSPITAL ENCOUNTER (OUTPATIENT)
Dept: OCCUPATIONAL THERAPY | Age: 27
Setting detail: THERAPIES SERIES
Discharge: HOME | End: 2022-02-07
Payer: COMMERCIAL

## 2022-02-07 PROCEDURE — 97140 MANUAL THERAPY 1/> REGIONS: CPT

## 2022-02-07 PROCEDURE — 97022 WHIRLPOOL THERAPY: CPT

## 2022-02-07 PROCEDURE — 97530 THERAPEUTIC ACTIVITIES: CPT

## 2022-02-07 PROCEDURE — 97110 THERAPEUTIC EXERCISES: CPT

## 2022-02-07 NOTE — PROGRESS NOTES
Jitendra 30 THERAPY PROGRESS NOTE    [facility]  [address] [facility]  [place]   Phone: [phone]   Fax: [phone]     Date:  2022    Initial Evaluation Date: 2021   Evaluating Therapist: KANDY Otoole    Patient Name:  Ekta Burrows    :  1995    Restrictions/Precautions: Per Distal Radius ORIF Protocol; Allergy: latex  Diagnosis:  R Distal Radius ORIF; S52.571A (ICD-10-CM) - Other closed intra-articular fracture of distal end of right radius, initial encounter   Date of Surgery/Injury: 2021 sx  Referring Practitioner:  SVEN Hernandez  Specific Practitioner Orders: Nonweightbearing right upper extremity. Follow distal radius protocol starting at the 3-week vimal. Insurance/Certification information: [facility] - Fully approved for 18 visits. Auth #  Plan of care signed (Y/N): Y - cosigned  Visit# / total visits: -18     Assessment of current deficits   [x] ADLs          [x] Strength     [x] IADLs     [x] Endurance     [x] ROM     [x] Pain                   [x] FM Coordination    [x] Sensation   [x] Scar Adhesion/Skin Integrity   [x] Edema      OT PLAN OF CARE   OT POC based on physician orders, patient diagnosis and results of clinical assessment.      Frequency/Duration: 2-3x/wk for 18 visits  /  Certification Period From: 2021  To: 3/21/2022     Specific OT Treatment to include:   [x] Instruction in HEP                   Modalities:  [x] Therapeutic Exercise               [x] Ultrasound           [x] PROM/Stretching                   [x] Fluidotherapy                           [x] AAROM  [x] AROM                 [x]  Paraffin      [x] Tendon Glides                        [x] ADL/IADL re-training    [x] Therapeutic Activity                [x] Pain Management with/without modalities PRN                 [x] Manual Therapy                     [x] Splinting [x] Scar Management                 [x]Joint Protection/Training  [x]Ergonomics                             [x] Joint Mobilization                    [x] Manual Edema Mobilization   [x] Myofascial Release                [x] Desensitization    [x] GM/FM Coordination              [x] Safety retraining/education per  individual diagnosis/goals     Patient Specific Goal: To eliminate fear of moving R hand and get it back to it's normal use. Progressing: was able to braid her hair. Feels most of her motion is back. GOALS (Long term same as Short term):  1) Patient will demonstrate good understanding of home program (exercises/activities/diagnosis/prognosis/goals) with good accuracy. 2) Patient will demonstrate increased active/passive range of motion of their R wrist/digits to [facility] for ADL/IADL completion. 3) Patient will demonstrate increased /pinch strength of at least 10 / 2-5 pinch pounds of their R hand when appropriate. 4) Patient to report decreased pain in their affected R upper extremity from 6-7/10 to 2/10 or less with resistive functional use. 5) Increase in fine motor function as evidenced by decreased time to complete 9-hole peg test and/or MRMT test by at least 25 seconds. 6) Patient to report 100% compliance with their splint wear, care, and precautions if needed. 7) Patient will be knowledgeable of edema control techniques as evident with decreases from moderate to mild/none. 8) Patient will demonstrate a non-tender/non-adherent scar. 9) Patient will report ADL/IADL functions as Mod I/I using R dominant UE.   10) Patient will demonstrate improved functional activity tolerance from poor+ to good for ADL/IADL completion. 11) Patient will decrease QuickDASH score to 25% or less for increased participation in daily functional activities.         TODAY'S TREATMENT     Pain Level: moderate, aching, throbbing, tight (pulling) and uncomfortable    SUBJECTIVE: Patient seen 1 / 2  scheduled visits. States; \"I have been working on the range of motion. \"     OBJECTIVE:  Engaged patient in the following with focus on ROM, EDEMA, strengthening per protocol, patient education. INTERVENTION: DONE  SPECIFICS/COMMENTS:   Modality:     Fluidotherapy  x *Affected UE: Completes x 15 min to promote tissue healing, skin desensitization, reduce inflammation, and decrease pain. Actively completed SROM in all available planes with holds at end range during treatment. Instructed patient in fabrication of rice bag to continue heat prior to stretches   UltraSound     E-Stim     PARAFFIN     Scar Mass/Edema Control:     Scar Management x Scar management with patient education and scar pad issued again   Edema Control x Manual edema mobilization to hand/forearm and soft tissue massage   PROM/Stretching:     forearm x Light stretch/PROM to forearm included in MFR massage to soft tissue  Achieved 60' supination today   Wrist x Volar glide with strap assist, followed by neuromuscular juan ROM    AROM/AAROM:     Wrist x Completed both AROM and AAROM to wrist extensors and flexors, handout provided for HEP previously,    Forearm x Completed both AROM and AAROM to supinators and pronators with handout provided and instructions for HEP inclusion. Therapeutic Activity:     Wristosizer x 5 reps closed chain   UBE x To improve motor control/coordination and motor endurance of affected UE, scapular mobility/stability challenge with speed/resistance change ups as increased function builds and improvements noted   Strengthening:     Wrist  Light strengthening with 2# wt for flexion/extension with no pain. Fatigue was high. Forearm pro/sup x 2#  10 x 2    Other:     HEP x Throughout Session with instructions and handouts as needed   Skilled judgment x Provided in selection of appropriate interventions.   Patient was educated in proper exercise technique and purpose for exercises. Kinesio taspe  To dorsal surface of hand for pain discomfort with wear/care instructions   ASSESSMENT: Patient shows potential to progress with stated goals and will be educated on HEP and provided written handouts as needed throughout their care. Reissued scar pad. Focus on pronation/supination stretch today. Advised patient to use heat and light massage to area of concern at this time. Current Wrist ROM: 47/47(62/65). HEP has been successful to date. Pt is m [employer] + progress toward stated plan of care. -Rehab Potential: Good -Requires OT Follow Up: Yes    Pt. Education:  [x] Yes  [] No  [x] Reviewed Prior HEP/Ed  Method of Education: [x] Verbal  [x] Demo  [] Written  Comprehension of Education:  [x] Verbalizes understanding. [] Demonstrates understanding. [x] Needs review at next sesion  [] Demonstrates/verbalizes HEP/Ed previously given. PLAN:   [x]  Continues Plan of care: Treatment delivered based on POC and graduated to patient's progress. Patient education continues at each visit to obtain maximum benefit from skilled OT intervention.   []  Alter Plan of care:   []  Discharge:    Billing:    TIME IN: [facility] 94: 445 TOTAL TREATMENT TIME: 55 TOTAL TIME: 60  CODE  TODAY MINUTES TODAY UNITS   03439 Fluidotherapy 10 1   38618 Manual 10 1   00027 Therapeutic Ex 20 1   03117 Therapeutic Activity 15 1   19843 ADL/COMP Tech Train     [phone] Neuromuscular Re-Ed     45292 [id number]     39829 Paraffin     10488 E-Stim     90350 Ultrasound                                    NANCY Chu COTA/L, [id number]

## 2022-02-09 ENCOUNTER — HOSPITAL ENCOUNTER (OUTPATIENT)
Dept: OCCUPATIONAL THERAPY | Age: 27
Setting detail: THERAPIES SERIES
Discharge: HOME | End: 2022-02-09
Payer: COMMERCIAL

## 2022-02-09 PROCEDURE — 97110 THERAPEUTIC EXERCISES: CPT

## 2022-02-09 PROCEDURE — 97530 THERAPEUTIC ACTIVITIES: CPT

## 2022-02-09 PROCEDURE — 97140 MANUAL THERAPY 1/> REGIONS: CPT

## 2022-02-09 NOTE — PROGRESS NOTES
Jitendra 30 THERAPY PROGRESS NOTE    [facility]  [address] [facility]  [place]   Phone: [phone]   Fax: [phone]     Date:  2022    Initial Evaluation Date: 2021   Evaluating Therapist: KANDY Pickering    Patient Name:  Riya Morris    :  1995    Restrictions/Precautions: Per Distal Radius ORIF Protocol; Allergy: latex  Diagnosis:  R Distal Radius ORIF; S52.571A (ICD-10-CM) - Other closed intra-articular fracture of distal end of right radius, initial encounter   Date of Surgery/Injury: 2021 sx  Referring Practitioner:  SVEN Cedeno  Specific Practitioner Orders: Nonweightbearing right upper extremity. Follow distal radius protocol starting at the 3-week vimal. Insurance[facility]Certification information[facility] Fully approved for 18 visits. Auth #  Plan of care signed (Y/N): Y - cosigned  Visit# / total visits: -18     Assessment of current deficits   [x] ADLs          [x] Strength     [x] IADLs     [x] Endurance     [x] ROM     [x] Pain                   [x] FM Coordination    [x] Sensation   [x] Scar Adhesion/Skin Integrity   [x] Edema      OT PLAN OF CARE   OT POC based on physician orders, patient diagnosis and results of clinical assessment.      Frequency/Duration: 2-3x/wk for 18 visits  /  Certification Period From: 2021  To: 3/21/2022     Specific OT Treatment to include:   [x] Instruction in HEP                   Modalities:  [x] Therapeutic Exercise               [x] Ultrasound           [x] PROM/Stretching                   [x] Fluidotherapy                           [x] AAROM  [x] AROM                 [x]  Paraffin      [x] Tendon Glides                        [x] ADL/IADL re-training    [x] Therapeutic Activity                [x] Pain Management with/without modalities PRN                 [x] Manual Therapy                     [x] Splinting [x] Scar Management                 [x]Joint Protection/Training  [x]Ergonomics                             [x] Joint Mobilization                    [x] Manual Edema Mobilization   [x] Myofascial Release                [x] Desensitization    [x] GM/FM Coordination              [x] Safety retraining/education per  individual diagnosis/goals     Patient Specific Goal: To eliminate fear of moving R hand and get it back to it's normal use. Progressing: was able to braid hair. Feels most of her motion is back. GOALS (Long term same as Short term):  1) Patient will demonstrate good understanding of home program (exercises/activities/diagnosis/prognosis/goals) with good accuracy. 2) Patient will demonstrate increased active/passive range of motion of their R wrist/digits to [facility] for ADL/IADL completion. 3) Patient will demonstrate increased /pinch strength of at least 10 / 2-5 pinch pounds of their R hand when appropriate. 4) Patient to report decreased pain in their affected R upper extremity from 6-7/10 to 2/10 or less with resistive functional use. 5) Increase in fine motor function as evidenced by decreased time to complete 9-hole peg test and/or MRMT test by at least 25 seconds. 6) Patient to report 100% compliance with their splint wear, care, and precautions if needed. 7) Patient will be knowledgeable of edema control techniques as evident with decreases from moderate to mild/none. 8) Patient will demonstrate a non-tender/non-adherent scar. 9) Patient will report ADL/IADL functions as Mod I/I using R dominant UE.   10) Patient will demonstrate improved functional activity tolerance from poor+ to good for ADL/IADL completion. 11) Patient will decrease QuickDASH score to 25% or less for increased participation in daily functional activities.         TODAY'S TREATMENT     Pain Level: minimal, fatigue, ache occasionally    SUBJECTIVE: Patient seen 1 / 2  scheduled visits. States; \"I really used my hand today. I was braiding and cleaning out my friend's car too. \"     OBJECTIVE:  Week 10: Engaged patient in the following with focus on ROM, EDEMA, strengthening per protocol, patient education. INTERVENTION: DONE  SPECIFICS/COMMENTS:   Modality:     Fluidotherapy      UltraSound     E-Stim     PARAFFIN     Scar Mass/Edema Control:     Scar Management  Scar management with patient education and scar pad issued again   Edema Control x Manual edema mobilization to hand/forearm and soft tissue massage   PROM/Stretching:     Wrist - closed chain stretch x Closed chain stretch to both flexors and extensors x10 each with end range holds   forearm x Light stretch/PROM to forearm included in MFR massage to soft tissue  Achieved 60' supination today   Wrist x Volar glide with strap assist, followed by neuromuscular juan ROM    AROM/AAROM:     Wrist x Completed both AROM and AAROM to wrist extensors and flexors, handout provided for HEP previously, in heated medium with supervision   Forearm x Completed both AROM and AAROM to supinators and pronators with handout provided and instructions for HEP inclusion. Therapeutic Activity:     Wristosizer x 7 reps closed chain   UBE x To improve motor control/coordination and motor endurance of affected UE, scapular mobility/stability challenge with speed/resistance change ups as increased function builds and improvements noted   Strengthening:     Gripper x 35#  18 reps x 2   Forearm x Light flexbar 30 both directions   Wrist x Light strengthening with 3# wt for flexion/extension with no pain. Fatigue was high. 30 x 2    Forearm pro/sup x 2#  10 x 2    Other:     HEP x Throughout Session with instructions and handouts as needed   Skilled judgment x Provided in selection of appropriate interventions. Patient was educated in proper exercise technique and purpose for exercises.    Kinesio taspe  To dorsal surface of hand for pain discomfort with wear/care instructions   ASSESSMENT: Patient shows potential to progress with stated goals and will be educated on HEP and provided written handouts as needed throughout their care. Reissued scar pad. Focus on pronation/supination stretch today. Advised patient to use heat and light massage to area of concern at this time. Current Wrist ROM: 44/47(63/58). HEP has been successful to date. Pt is m [employer] + progress toward stated plan of care. -Rehab Potential: Good -Requires OT Follow Up: Yes    Pt. Education:  [x] Yes  [] No  [x] Reviewed Prior HEP/Ed  Method of Education: [x] Verbal  [x] Demo  [] Written  Comprehension of Education:  [x] Verbalizes understanding. [] Demonstrates understanding. [x] Needs review at next sesion  [] Demonstrates/verbalizes HEP/Ed previously given. PLAN:   [x]  Continues Plan of care: Treatment delivered based on POC and graduated to patient's progress. Patient education continues at each visit to obtain maximum benefit from skilled OT intervention.   []  Alter Plan of care:   []  Discharge:    Billing:    TIME IN: [facility] 94: 445 TOTAL TREATMENT TIME: 55 TOTAL TIME: 60  CODE  TODAY MINUTES TODAY UNITS   41133 Fluidotherapy     99595 Manual 15 1   23032 Therapeutic Ex 30 2   30525 Therapeutic Activity 10 1   14830 ADL/COMP Tech Train     [id number] Neuromuscular Re-Ed     01569 OrthoManagementTraining     47537 Paraffin     52174 E-Stim     68570 Ultrasound                                    NANCY Santos COTA/L, [id number]

## 2022-02-11 DIAGNOSIS — S52.571A OTHER CLOSED INTRA-ARTICULAR FRACTURE OF DISTAL END OF RIGHT RADIUS, INITIAL ENCOUNTER: ICD-10-CM

## 2022-02-14 ENCOUNTER — HOSPITAL ENCOUNTER (OUTPATIENT)
Dept: OCCUPATIONAL THERAPY | Age: 27
Setting detail: THERAPIES SERIES
Discharge: HOME | End: 2022-02-14
Payer: COMMERCIAL

## 2022-02-14 RX ORDER — PSEUDOEPHED/ACETAMINOPH/DIPHEN 30MG-500MG
TABLET ORAL
Qty: 120 TABLET | Refills: 1 | Status: SHIPPED
Start: 2022-02-14 | End: 2022-09-05

## 2022-02-14 NOTE — PROGRESS NOTES
[address] [facility]    Outpatient Occupational Therapy    Phone: [phone]   Fax: [phone]     Cancellation/No-show Note    Date:  2022    Patient Name:  Oumar Montenegro    :  1995     PT ID: [id number]    Total missed visits including today: 4   Total number of no shows: 1    For today's appointment patient:    [x]  Cancelled & Rescheduled appointment  []  No-show     Reason given by patient:  []  Patient ill  []  Conflicting appointment  []  No transportation    []  Conflict with work  []  No reason given  []  Other:       Comments:  Work    Electronically signed by:  NANCY Titus COTA/SAKINA  [id number] NANCY

## 2022-02-14 NOTE — TELEPHONE ENCOUNTER
Pharmacy interface sent refill request for Tylenol. Order pended and routed for decision and signature.

## 2022-02-16 ENCOUNTER — HOSPITAL ENCOUNTER (OUTPATIENT)
Dept: OCCUPATIONAL THERAPY | Age: 27
Setting detail: THERAPIES SERIES
Discharge: HOME | End: 2022-02-16
Payer: COMMERCIAL

## 2022-02-16 PROCEDURE — 97530 THERAPEUTIC ACTIVITIES: CPT

## 2022-02-16 PROCEDURE — 97140 MANUAL THERAPY 1/> REGIONS: CPT

## 2022-02-16 PROCEDURE — 97110 THERAPEUTIC EXERCISES: CPT

## 2022-02-16 NOTE — PROGRESS NOTES
Jitendra 31 THERAPY PROGRESS NOTE  PROGRESS UPDATE/DISCHARGE SUMMARY    [facility]  [address]  [place]   Phone: [phone]   Fax: [phone]     Date:  2022    Initial Evaluation Date: 2021   Evaluating Therapist: KANDY Stein    Patient Name:  Kimberly Cobb    :  1995    Restrictions/Precautions: Per Distal Radius ORIF Protocol; Allergy: latex  Diagnosis:  R Distal Radius ORIF; S52.571A (ICD-10-CM) - Other closed intra-articular fracture of distal end of right radius, initial encounter   Date of Surgery/Injury: 2021 sx  Referring Practitioner:  SVEN Jolley  Specific Practitioner Orders: Nonweightbearing right upper extremity. Follow distal radius protocol starting at the 3-week vimal. Insurance/Certification information: [facility] - Fully approved for 18 visits. Plan of care signed (Y/N): Y - cosigned  Visit# / total visits: 10 / 12-18     Assessment of current deficits   [x] ADLs          [x] Strength     [x] IADLs     [x] Endurance     [x] ROM     [x] Pain                   [x] FM Coordination    [x] Sensation   [x] Scar Adhesion/Skin Integrity   [x] Edema      OT PLAN OF CARE   OT POC based on physician orders, patient diagnosis and results of clinical assessment.      Frequency/Duration: 2-3x/wk for 18 visits  /  Certification Period From: 2021  To: 3/21/2022     Specific OT Treatment to include:   [x] Instruction in HEP                   Modalities:  [x] Therapeutic Exercise               [x] Ultrasound           [x] PROM/Stretching                   [x] Fluidotherapy                           [x] AAROM  [x] AROM                 [x]  Paraffin      [x] Tendon Glides                        [x] ADL/IADL re-training    [x] Therapeutic Activity                [x] Pain Management with/without modalities PRN                 [x] Manual Therapy [x] Splinting                                    [x] Scar Management                 [x]Joint Protection/Training  [x]Ergonomics                             [x] Joint Mobilization                    [x] Manual Edema Mobilization   [x] Myofascial Release                [x] Desensitization    [x] GM/FM Coordination              [x] Safety retraining/education per  individual diagnosis/goals     Patient Specific Goal: To eliminate fear of moving R hand and get it back to it's normal use. Progressing: was able to braid hair. Feels most of her motion is back. Lacking full    strength at this time. GOALS (Long term same as Short term):  1) Patient will demonstrate good understanding of home program with good accuracy. Goal Reached:  Extensive training done so she can progress herself with the HEP with written handouts and material(putty & bands) provided to use when allowed by physician. 2) Patient will demonstrate increased active/passive range of motion of their R wrist/digits to [facility] for ADL/IADL completion. Goal reached:  45/50(60/60) Patient has the potential to reach WNL in the coming weeks with HEP provided. 3) Patient will demonstrate increased /pinch strength of at least 10 / 2-5 pinch pounds of their R hand when appropriate. Goal at maximum potential at this time:  Patient has a 5# limit at this time, however, she was provided with extensive HEP when allowed to do more by physician. /Pinch:  R = 45#/13#  L = 75#/16#    4) Patient to report decreased pain in their affected R upper extremity from 6-7/10 to 2/10 or less with resistive functional use. Goal met as of today:  0-2/10  Reports pain only after a day's work    5) Increase in fine motor function as evidenced by decreased time to complete 9-hole peg test and/or MRMT test by at least 25 seconds.     Goal Reached:  R = 16.36  L =  16.88    6) Patient to report 100% compliance with their splint wear, care, and precautions if needed. Goal Discharge    7) Patient will be knowledgeable of edema control techniques as evident with decreases from moderate to mild/none. Goal Reached:  No visible edema: 20% if overused. 8) Patient will demonstrate a non-tender/non-adherent scar. Goal at Maximum potential at this point: has extensive program in place with scar massage, scar pad. 9) Patient will report ADL/IADL functions as Mod I/I using R dominant UE. Goal Reached:  Can complete all work related tasks. ADLs are independent    10) Patient will demonstrate improved functional activity tolerance from poor+ to good for ADL/IADL completion. Goal at maximum potential at this time:  Fatigues easily but is improving. Fair+     11) Patient will decrease QuickDASH score to 25% or less for increased participation in daily functional activities. Goal Reached:  6.82%       TODAY'S TREATMENT     Pain Level: zero, fatigue, ache occasionally    SUBJECTIVE: Patient seen 1 / 2  scheduled visits. States; \"I just got a job out of town and need to move in a week. I think I can do this at home if you give me an HEP. \"     OBJECTIVE:  Week 10: Engaged patient in the following with focus on ROM, EDEMA, strengthening per protocol, patient education.    INTERVENTION: DONE  SPECIFICS/COMMENTS:   Modality:     Fluidotherapy      UltraSound     E-Stim     PARAFFIN     Scar Mass/Edema Control:     Scar Management  Scar management with patient education and scar pad issued again   Edema Control  Manual edema mobilization to hand/forearm and soft tissue massage   PROM/Stretching:     Wrist - closed chain stretch x Closed chain stretch to both flexors and extensors x10 each with end range holds   forearm x Light stretch/PROM to forearm included in MFR massage to soft tissue  Achieved 60' supination today   Wrist x Volar glide with strap assist, followed by neuromuscular juan ROM    AROM/AAROM:     Wrist x Completed both AROM and AAROM to wrist extensors and flexors, handout provided for HEP previously, in heated medium with supervision   Forearm x Completed both AROM and AAROM to supinators and pronators with handout provided and instructions for HEP inclusion. Therapeutic Activity:               Strengthening:     Gripper x 35#  18 reps x 2 isometric   Forearm x Light flexbar 30 both directions isometric holds   Wrist x Light strengthening with 3# wt for flexion/extension with no pain. Fatigue was high. 30 x 2    Forearm pro/sup x 2#  10 x 2    Other:     HEP x Throughout Session with instructions and handouts as needed   Skilled judgment x Provided in selection of appropriate interventions. Patient was educated in proper exercise technique and purpose for exercises. Kinesio taspe  To dorsal surface of hand for pain discomfort with wear/care instructions     ASSESSMENT: Patient has reached maximum potential with stated goals at this time. Continue HEP to maintain and/or progress functional status. Patient benefited from skilled OT intervention at this time. Patient has requested discharge at this time due to new work situation. We completed an extensive HEP program with material and handouts for her to progress. She felt confident she can do this independently. She did make good progress toward all goals. She fatigues easily and does not have the strength needed to safely complete her job tasks or home making tasks but she is aware of her limitation and will continue to use caution as she progresses. -Rehab Potential: Good -Requires OT Follow Up: Not at this time. Should she encounter difficulties or setbacks then skilled OT intervention could help. PLAN:   []  Continues Plan of care: Treatment delivered based on POC and graduated to patient's progress. Patient education continues at each visit to obtain maximum benefit from skilled OT intervention.   []  Alter Plan of care:   [x]  Discharge: with extensive HEP in place. Discharge per her request    Billing:    TIME IN: [address]: 445 TOTAL TREATMENT TIME: 55 TOTAL TIME: 60  CODE  TODAY MINUTES TODAY UNITS   25112 Fluidotherapy     06678 Manual 15 1   51943 Therapeutic Ex 30 2   73182 Therapeutic Activity 10 1   62729 ADL/COMP Tech Train     [id number] Neuromuscular Re-Ed     31428 OrthoManagementTraining     90128 Paraffin     13451 E-Stim     07263 Ultrasound                        Access Code: [email or web address]  URL: [email or web address]  Date: 02/16/2022   Prepared by: Jeanmarie Deleon  Exercises  Wrist Extension with Resistance - 1 x daily - 5 x weekly - 3 sets - 20 reps - 3 count at end range hold  Wrist Flexion with Resistance - 1 x daily - 5 x weekly - 3 sets - 20 reps - 3 count at end range hold  Seated Wrist Radial Deviation with Anchored Resistance - 1 x daily - 5 x weekly - 3 sets - 20 reps - 3 count at end range hold  Forearm Pronation and Supination with Hammer - 1 x daily - 5 x weekly - 3 sets - 20 reps - 3 count at end range hold              NANCY De Anda,  DORYS/L, [id number]    I agree with the above discharge. Please refer back to skilled OT with new script if necessary. Extensive HEP in place. Collaboration with DORYS completed.   Morenita Neil Negro 88, OTR/L #[id number]

## 2022-02-17 ENCOUNTER — TELEPHONE (OUTPATIENT)
Dept: ORTHOPEDIC SURGERY | Age: 27
End: 2022-02-17

## 2022-02-17 DIAGNOSIS — S52.571D OTHER CLOSED INTRA-ARTICULAR FRACTURE OF DISTAL END OF RIGHT RADIUS WITH ROUTINE HEALING, SUBSEQUENT ENCOUNTER: Primary | ICD-10-CM

## 2022-02-21 ENCOUNTER — APPOINTMENT (OUTPATIENT)
Dept: OCCUPATIONAL THERAPY | Age: 27
End: 2022-02-21
Payer: COMMERCIAL

## 2022-02-23 ENCOUNTER — HOSPITAL ENCOUNTER (OUTPATIENT)
Dept: GENERAL RADIOLOGY | Age: 27
Discharge: HOME | End: 2022-02-25
Payer: COMMERCIAL

## 2022-02-23 ENCOUNTER — OFFICE VISIT (OUTPATIENT)
Dept: ORTHOPEDIC SURGERY | Age: 27
End: 2022-02-23
Payer: COMMERCIAL

## 2022-02-23 ENCOUNTER — APPOINTMENT (OUTPATIENT)
Dept: OCCUPATIONAL THERAPY | Age: 27
End: 2022-02-23
Payer: COMMERCIAL

## 2022-02-23 VITALS — TEMPERATURE: 98.6 F

## 2022-02-23 DIAGNOSIS — S52.571D OTHER CLOSED INTRA-ARTICULAR FRACTURE OF DISTAL END OF RIGHT RADIUS WITH ROUTINE HEALING, SUBSEQUENT ENCOUNTER: Primary | ICD-10-CM

## 2022-02-23 DIAGNOSIS — S52.571D OTHER CLOSED INTRA-ARTICULAR FRACTURE OF DISTAL END OF RIGHT RADIUS WITH ROUTINE HEALING, SUBSEQUENT ENCOUNTER: ICD-10-CM

## 2022-02-23 PROCEDURE — 99212 OFFICE O/P EST SF 10 MIN: CPT | Performed by: PHYSICIAN ASSISTANT

## 2022-02-23 PROCEDURE — 73110 X-RAY EXAM OF WRIST: CPT

## 2022-02-23 PROCEDURE — 99024 POSTOP FOLLOW-UP VISIT: CPT | Performed by: PHYSICIAN ASSISTANT

## 2022-02-23 NOTE — PROGRESS NOTES
Alyssa Lugo is a 23 y.o. female who presents for follow up of right distal radius orif    SURGEON: Dr. Thad Rome MD  Date of Injury/Surgery: 12/2/2021  Date last seen in office: 01/11/2022    Symptoms: better  New complaints: patient states she has no pain and about 85% of ROM; patient states she has finished PT    Weightbearing: right upper Full weight bearing      Assistive device No Device  Participating in therapy (location if yes)?  no    Refills Needed: None  Order/Referral Needed: no

## 2022-02-23 NOTE — PATIENT INSTRUCTIONS
Progressing with activity as tolerated  Continue with home exercise program to work on range of motion, okay to progress with weightbearing as tolerated    Over-the-counter Tylenol, ice and heat as needed  Follow-up with orthopedic trauma as needed

## 2022-02-28 ENCOUNTER — APPOINTMENT (OUTPATIENT)
Dept: OCCUPATIONAL THERAPY | Age: 27
End: 2022-02-28
Payer: COMMERCIAL

## 2022-03-01 NOTE — PROGRESS NOTES
Chief Complaint   Patient presents with    Wrist Pain     rt distal radius orif; dos: 12/2/2021; patient has about 85% ROM; patient just finished PT b/c she states she is leaving the state; patient was very vague if and or when she is going to be coming back to [facility]; 0/10 pain        OP:SURGEON: Dr. Patrice Toro MD  DATE OF PROCEDURE: 12/2/21  PROCEDURE: Right distal radius ORIF    POD: 3 months    Subjective:  Ekta Burrows is following up from the above surgery. She is WBAT on right upper extremity. Pain to extremity is none and is not taking prescribed pain medication, will use OTC PRN. They denies numbness, tingling, weakness to the right upper extremity. Denies calf pain, chest pain, or shortness of breath. Patient has finished OT. Patient feels she has regained significant ROM, getting back to ADLs. Review of Systems -  All pertinent positives/negatives per HPI       Objective:    General: Alert and oriented X 3, normocephalic atraumatic, external ears and eye normal, sclera clear, no acute distress, respirations easy and unlabored with no audible wheezes, skin warm and dry, speech and dress appropriate for noted age, affect euthymic. Extremity:  Right Upper Extremity  Skin is clean dry and intact without skin breakdown  no edema noted  2+ Radial pulse, fingers warm with BCR  Flex/extension intact to wrist, thumb and fingers .  Wrist ROM nearly equal to contralateral side  Finger opposition intact  Finger adduction/abduction intact  Finger crossover intact  able to make concentric fist  Subjectively states sensation intact to Median Nerve, Ulnar Nerve and Radial Nerve distribution  Incision(s) well healed without hypertrophic or keloid changes      Temp 98.6 °F (37 °C)   LMP 01/12/2022     XR:   3 XRAY VIEWS OF THE RIGHT WRIST       2/23/2022 10:32 am       COMPARISON:   X-ray dated 01/11/2022       HISTORY:   ORDERING SYSTEM PROVIDED HISTORY: Other closed intra-articular fracture of   distal end of right radius with routine healing, subsequent encounter   TECHNOLOGIST PROVIDED HISTORY:   Reason for exam:->fx   What reading provider will be dictating this exam?->CRC       FINDINGS:   Stable appearance of right distal radial hardware plate and screw fixation   without evidence of loosening or failure.  No periprosthetic fracture or   interval change in overall alignment.  Carpal rows preserved           Impression   Stable appearance of right radius plate and screw fixation hardware and   overall alignment             Assessment:   Diagnosis Orders   1. Other closed intra-articular fracture of distal end of right radius with routine healing, subsequent encounter         Plan:  Progressing with activity as tolerated  Continue with home exercise program to work on range of motion, okay to progress with weightbearing as tolerated    Over-the-counter Tylenol, ice and heat as needed  Follow-up with orthopedic trauma as needed      Electronically signed by Brenna Dance, PA-C on 3/1/2022 at 5:45 PM  Note: This report was completed using [employer] voiced recognition software. Every effort has been made to ensure accuracy; however, inadvertent computerized transcription errors may be present.

## 2022-03-08 DIAGNOSIS — S52.571A OTHER CLOSED INTRA-ARTICULAR FRACTURE OF DISTAL END OF RIGHT RADIUS, INITIAL ENCOUNTER: ICD-10-CM

## 2022-03-08 RX ORDER — ASCORBIC ACID 500 MG
TABLET ORAL
Qty: 30 TABLET | Refills: 2 | Status: SHIPPED
Start: 2022-03-08 | End: 2022-09-19

## 2022-03-08 NOTE — TELEPHONE ENCOUNTER
Orders signed. Please see attached. Thank you.   Electronically signed by Brenna Dance, PA-C on 3/8/2022 at 8:23 AM

## 2022-03-08 NOTE — TELEPHONE ENCOUNTER
Pt's pharmacy sent refill request for Vitamin C. Order pended and routed for decision and signature.

## 2022-09-01 ENCOUNTER — ANESTHESIA EVENT (OUTPATIENT)
Dept: LABOR AND DELIVERY | Age: 27
End: 2022-09-01
Payer: COMMERCIAL

## 2022-09-01 ENCOUNTER — HOSPITAL ENCOUNTER (INPATIENT)
Age: 27
LOS: 4 days | Discharge: HOME | End: 2022-09-05
Attending: OBSTETRICS & GYNECOLOGY | Admitting: OBSTETRICS & GYNECOLOGY
Payer: COMMERCIAL

## 2022-09-01 ENCOUNTER — APPOINTMENT (OUTPATIENT)
Dept: CT IMAGING | Age: 27
End: 2022-09-01
Payer: COMMERCIAL

## 2022-09-01 ENCOUNTER — ANCILLARY PROCEDURE (OUTPATIENT)
Dept: OBGYN CLINIC | Age: 27
End: 2022-09-01
Payer: COMMERCIAL

## 2022-09-01 ENCOUNTER — APPOINTMENT (OUTPATIENT)
Dept: OBGYN CLINIC | Age: 27
End: 2022-09-01
Payer: COMMERCIAL

## 2022-09-01 ENCOUNTER — ANESTHESIA (OUTPATIENT)
Dept: LABOR AND DELIVERY | Age: 27
End: 2022-09-01
Payer: COMMERCIAL

## 2022-09-01 DIAGNOSIS — Z3A.32 32 WEEKS GESTATION OF PREGNANCY: Primary | ICD-10-CM

## 2022-09-01 PROBLEM — O26.899 HEADACHE IN PREGNANCY, ANTEPARTUM: Status: ACTIVE | Noted: 2022-09-01

## 2022-09-01 PROBLEM — Z34.03 PRIMIGRAVIDA IN THIRD TRIMESTER: Status: ACTIVE | Noted: 2022-09-01

## 2022-09-01 PROBLEM — R51.9 HEADACHE IN PREGNANCY, ANTEPARTUM: Status: ACTIVE | Noted: 2022-09-01

## 2022-09-01 PROBLEM — R79.89 ABNORMAL LFTS: Status: ACTIVE | Noted: 2022-09-01

## 2022-09-01 PROBLEM — O14.13 SEVERE PRE-ECLAMPSIA IN THIRD TRIMESTER: Status: ACTIVE | Noted: 2022-09-01

## 2022-09-01 PROBLEM — O30.043 DICHORIONIC DIAMNIOTIC TWIN PREGNANCY IN THIRD TRIMESTER: Status: ACTIVE | Noted: 2022-09-01

## 2022-09-01 LAB
ABO/RH: NORMAL
ALBUMIN SERPL-MCNC: 3 G/DL (ref 3.5–5.2)
ALP BLD-CCNC: 309 U/L (ref 35–104)
ALT SERPL-CCNC: 20 U/L (ref 0–32)
AMPHETAMINE SCREEN, URINE: NOT DETECTED
ANION GAP SERPL CALCULATED.3IONS-SCNC: 13 MMOL/L (ref 7–16)
ANTIBODY SCREEN: NORMAL
AST SERPL-CCNC: 56 U/L (ref 0–31)
BACTERIA: ABNORMAL /HPF
BARBITURATE SCREEN URINE: POSITIVE
BENZODIAZEPINE SCREEN, URINE: NOT DETECTED
BILIRUB SERPL-MCNC: 0.4 MG/DL (ref 0–1.2)
BILIRUBIN URINE: NEGATIVE
BLOOD, URINE: NEGATIVE
BUN BLDV-MCNC: 11 MG/DL (ref 6–20)
CALCIUM SERPL-MCNC: 9.3 MG/DL (ref 8.6–10.2)
CANNABINOID SCREEN URINE: NOT DETECTED
CHLORIDE BLD-SCNC: 104 MMOL/L (ref 98–107)
CLARITY: ABNORMAL
CO2: 18 MMOL/L (ref 22–29)
COCAINE METABOLITE SCREEN URINE: NOT DETECTED
COLOR: YELLOW
CREAT SERPL-MCNC: 1.1 MG/DL (ref 0.5–1)
CREATININE URINE: 52 MG/DL (ref 29–226)
EPITHELIAL CELLS, UA: ABNORMAL /HPF
FENTANYL SCREEN, URINE: NOT DETECTED
GFR AFRICAN AMERICAN: >60
GFR NON-AFRICAN AMERICAN: >60 ML/MIN/1.73
GLUCOSE BLD-MCNC: 79 MG/DL (ref 74–99)
GLUCOSE URINE: NEGATIVE MG/DL
HCT VFR BLD CALC: 35.8 % (ref 34–48)
HEMOGLOBIN: 12 G/DL (ref 11.5–15.5)
KETONES, URINE: ABNORMAL MG/DL
LEUKOCYTE ESTERASE, URINE: ABNORMAL
Lab: ABNORMAL
MCH RBC QN AUTO: 32.1 PG (ref 26–35)
MCHC RBC AUTO-ENTMCNC: 33.5 % (ref 32–34.5)
MCV RBC AUTO: 95.7 FL (ref 80–99.9)
METHADONE SCREEN, URINE: NOT DETECTED
NITRITE, URINE: NEGATIVE
OPIATE SCREEN URINE: NOT DETECTED
OXYCODONE URINE: NOT DETECTED
PDW BLD-RTO: 14.2 FL (ref 11.5–15)
PH UA: 6 (ref 5–9)
PHENCYCLIDINE SCREEN URINE: NOT DETECTED
PLATELET # BLD: 172 E9/L (ref 130–450)
PMV BLD AUTO: 10.8 FL (ref 7–12)
POTASSIUM SERPL-SCNC: 3.7 MMOL/L (ref 3.5–5)
PROTEIN PROTEIN: 6 MG/DL (ref 0–12)
PROTEIN UA: NEGATIVE MG/DL
PROTEIN/CREAT RATIO: 0.1
PROTEIN/CREAT RATIO: 0.1 (ref 0–0.2)
RBC # BLD: 3.74 E12/L (ref 3.5–5.5)
RBC UA: ABNORMAL /HPF (ref 0–2)
SODIUM BLD-SCNC: 135 MMOL/L (ref 132–146)
SPECIFIC GRAVITY UA: <=1.005 (ref 1–1.03)
TOTAL PROTEIN: 5.8 G/DL (ref 6.4–8.3)
URIC ACID, SERUM: 7.4 MG/DL (ref 2.4–5.7)
UROBILINOGEN, URINE: 0.2 E.U./DL
WBC # BLD: 11.4 E9/L (ref 4.5–11.5)
WBC UA: ABNORMAL /HPF (ref 0–5)

## 2022-09-01 PROCEDURE — 6360000002 HC RX W HCPCS: Performed by: OBSTETRICS & GYNECOLOGY

## 2022-09-01 PROCEDURE — 3700000001 HC ADD 15 MINUTES (ANESTHESIA): Performed by: OBSTETRICS & GYNECOLOGY

## 2022-09-01 PROCEDURE — 85027 COMPLETE CBC AUTOMATED: CPT

## 2022-09-01 PROCEDURE — 2500000003 HC RX 250 WO HCPCS

## 2022-09-01 PROCEDURE — 6360000002 HC RX W HCPCS: Performed by: ANESTHESIOLOGY

## 2022-09-01 PROCEDURE — 87491 CHLMYD TRACH DNA AMP PROBE: CPT

## 2022-09-01 PROCEDURE — 2709999900 HC NON-CHARGEABLE SUPPLY: Performed by: OBSTETRICS & GYNECOLOGY

## 2022-09-01 PROCEDURE — 36415 COLL VENOUS BLD VENIPUNCTURE: CPT

## 2022-09-01 PROCEDURE — 76819 FETAL BIOPHYS PROFIL W/O NST: CPT | Performed by: OBSTETRICS & GYNECOLOGY

## 2022-09-01 PROCEDURE — 82570 ASSAY OF URINE CREATININE: CPT

## 2022-09-01 PROCEDURE — 80307 DRUG TEST PRSMV CHEM ANLYZR: CPT

## 2022-09-01 PROCEDURE — 1220000000 HC SEMI PRIVATE OB R&B

## 2022-09-01 PROCEDURE — 86850 RBC ANTIBODY SCREEN: CPT

## 2022-09-01 PROCEDURE — 84156 ASSAY OF PROTEIN URINE: CPT

## 2022-09-01 PROCEDURE — 6360000002 HC RX W HCPCS: Performed by: NURSE PRACTITIONER

## 2022-09-01 PROCEDURE — 6360000002 HC RX W HCPCS

## 2022-09-01 PROCEDURE — 81001 URINALYSIS AUTO W/SCOPE: CPT

## 2022-09-01 PROCEDURE — 80053 COMPREHEN METABOLIC PANEL: CPT

## 2022-09-01 PROCEDURE — 6370000000 HC RX 637 (ALT 250 FOR IP): Performed by: OBSTETRICS & GYNECOLOGY

## 2022-09-01 PROCEDURE — 3700000000 HC ANESTHESIA ATTENDED CARE: Performed by: OBSTETRICS & GYNECOLOGY

## 2022-09-01 PROCEDURE — 87591 N.GONORRHOEAE DNA AMP PROB: CPT

## 2022-09-01 PROCEDURE — 7100000000 HC PACU RECOVERY - FIRST 15 MIN: Performed by: OBSTETRICS & GYNECOLOGY

## 2022-09-01 PROCEDURE — 84550 ASSAY OF BLOOD/URIC ACID: CPT

## 2022-09-01 PROCEDURE — 86901 BLOOD TYPING SEROLOGIC RH(D): CPT

## 2022-09-01 PROCEDURE — 2580000003 HC RX 258: Performed by: OBSTETRICS & GYNECOLOGY

## 2022-09-01 PROCEDURE — 3609079900 HC CESAREAN SECTION: Performed by: OBSTETRICS & GYNECOLOGY

## 2022-09-01 PROCEDURE — G0480 DRUG TEST DEF 1-7 CLASSES: HCPCS

## 2022-09-01 PROCEDURE — 70450 CT HEAD/BRAIN W/O DYE: CPT

## 2022-09-01 PROCEDURE — 99222 1ST HOSP IP/OBS MODERATE 55: CPT | Performed by: OBSTETRICS & GYNECOLOGY

## 2022-09-01 PROCEDURE — 88307 TISSUE EXAM BY PATHOLOGIST: CPT

## 2022-09-01 PROCEDURE — 86900 BLOOD TYPING SEROLOGIC ABO: CPT

## 2022-09-01 PROCEDURE — 99222 1ST HOSP IP/OBS MODERATE 55: CPT | Performed by: NURSE PRACTITIONER

## 2022-09-01 PROCEDURE — 7100000001 HC PACU RECOVERY - ADDTL 15 MIN: Performed by: OBSTETRICS & GYNECOLOGY

## 2022-09-01 RX ORDER — TRISODIUM CITRATE DIHYDRATE AND CITRIC ACID MONOHYDRATE 500; 334 MG/5ML; MG/5ML
30 SOLUTION ORAL ONCE
Status: COMPLETED | OUTPATIENT
Start: 2022-09-01 | End: 2022-09-01

## 2022-09-01 RX ORDER — MORPHINE SULFATE 4 MG/ML
4 INJECTION, SOLUTION INTRAMUSCULAR; INTRAVENOUS
Status: DISCONTINUED | OUTPATIENT
Start: 2022-09-01 | End: 2022-09-05 | Stop reason: HOSPADM

## 2022-09-01 RX ORDER — SODIUM CHLORIDE 9 MG/ML
INJECTION, SOLUTION INTRAVENOUS PRN
Status: DISCONTINUED | OUTPATIENT
Start: 2022-09-01 | End: 2022-09-05 | Stop reason: HOSPADM

## 2022-09-01 RX ORDER — SODIUM CHLORIDE, SODIUM LACTATE, POTASSIUM CHLORIDE, CALCIUM CHLORIDE 600; 310; 30; 20 MG/100ML; MG/100ML; MG/100ML; MG/100ML
INJECTION, SOLUTION INTRAVENOUS CONTINUOUS
Status: DISCONTINUED | OUTPATIENT
Start: 2022-09-01 | End: 2022-09-05 | Stop reason: HOSPADM

## 2022-09-01 RX ORDER — BETAMETHASONE SODIUM PHOSPHATE AND BETAMETHASONE ACETATE 3; 3 MG/ML; MG/ML
INJECTION, SUSPENSION INTRA-ARTICULAR; INTRALESIONAL; INTRAMUSCULAR; SOFT TISSUE
Status: COMPLETED
Start: 2022-09-01 | End: 2022-09-01

## 2022-09-01 RX ORDER — ONDANSETRON 2 MG/ML
4 INJECTION INTRAMUSCULAR; INTRAVENOUS EVERY 6 HOURS PRN
Status: DISCONTINUED | OUTPATIENT
Start: 2022-09-01 | End: 2022-09-05 | Stop reason: HOSPADM

## 2022-09-01 RX ORDER — IBUPROFEN 600 MG/1
600 TABLET ORAL EVERY 6 HOURS PRN
Status: DISCONTINUED | OUTPATIENT
Start: 2022-09-01 | End: 2022-09-05 | Stop reason: HOSPADM

## 2022-09-01 RX ORDER — OXYCODONE HYDROCHLORIDE 5 MG/1
5 TABLET ORAL EVERY 4 HOURS PRN
Status: ACTIVE | OUTPATIENT
Start: 2022-09-01 | End: 2022-09-02

## 2022-09-01 RX ORDER — OXYCODONE HYDROCHLORIDE 5 MG/1
10 TABLET ORAL EVERY 4 HOURS PRN
Status: DISPENSED | OUTPATIENT
Start: 2022-09-01 | End: 2022-09-02

## 2022-09-01 RX ORDER — SODIUM CHLORIDE 0.9 % (FLUSH) 0.9 %
5-40 SYRINGE (ML) INJECTION EVERY 12 HOURS SCHEDULED
Status: DISCONTINUED | OUTPATIENT
Start: 2022-09-01 | End: 2022-09-05 | Stop reason: HOSPADM

## 2022-09-01 RX ORDER — SIMETHICONE 80 MG
80 TABLET,CHEWABLE ORAL EVERY 6 HOURS PRN
Status: DISCONTINUED | OUTPATIENT
Start: 2022-09-01 | End: 2022-09-05 | Stop reason: HOSPADM

## 2022-09-01 RX ORDER — ONDANSETRON 2 MG/ML
4 INJECTION INTRAMUSCULAR; INTRAVENOUS EVERY 6 HOURS PRN
Status: ACTIVE | OUTPATIENT
Start: 2022-09-01 | End: 2022-09-02

## 2022-09-01 RX ORDER — DOCUSATE SODIUM 100 MG/1
100 CAPSULE, LIQUID FILLED ORAL 2 TIMES DAILY
Status: DISCONTINUED | OUTPATIENT
Start: 2022-09-01 | End: 2022-09-05 | Stop reason: HOSPADM

## 2022-09-01 RX ORDER — MODIFIED LANOLIN
OINTMENT (GRAM) TOPICAL
Status: DISCONTINUED | OUTPATIENT
Start: 2022-09-01 | End: 2022-09-05 | Stop reason: HOSPADM

## 2022-09-01 RX ORDER — NALOXONE HYDROCHLORIDE 0.4 MG/ML
INJECTION, SOLUTION INTRAMUSCULAR; INTRAVENOUS; SUBCUTANEOUS PRN
Status: ACTIVE | OUTPATIENT
Start: 2022-09-01 | End: 2022-09-02

## 2022-09-01 RX ORDER — MORPHINE SULFATE 2 MG/ML
2 INJECTION, SOLUTION INTRAMUSCULAR; INTRAVENOUS
Status: DISCONTINUED | OUTPATIENT
Start: 2022-09-01 | End: 2022-09-05 | Stop reason: HOSPADM

## 2022-09-01 RX ORDER — PHENYLEPHRINE HCL IN 0.9% NACL 1 MG/10 ML
SYRINGE (ML) INTRAVENOUS PRN
Status: DISCONTINUED | OUTPATIENT
Start: 2022-09-01 | End: 2022-09-01 | Stop reason: SDUPTHER

## 2022-09-01 RX ORDER — FERROUS SULFATE 325(65) MG
325 TABLET ORAL 2 TIMES DAILY WITH MEALS
Status: DISCONTINUED | OUTPATIENT
Start: 2022-09-01 | End: 2022-09-05 | Stop reason: HOSPADM

## 2022-09-01 RX ORDER — KETOROLAC TROMETHAMINE 30 MG/ML
15 INJECTION, SOLUTION INTRAMUSCULAR; INTRAVENOUS EVERY 6 HOURS PRN
Status: DISPENSED | OUTPATIENT
Start: 2022-09-01 | End: 2022-09-02

## 2022-09-01 RX ORDER — ONDANSETRON 4 MG/1
4 TABLET, ORALLY DISINTEGRATING ORAL EVERY 8 HOURS PRN
Status: DISCONTINUED | OUTPATIENT
Start: 2022-09-01 | End: 2022-09-05 | Stop reason: HOSPADM

## 2022-09-01 RX ORDER — SODIUM CHLORIDE 0.9 % (FLUSH) 0.9 %
10 SYRINGE (ML) INJECTION PRN
Status: DISCONTINUED | OUTPATIENT
Start: 2022-09-01 | End: 2022-09-05 | Stop reason: HOSPADM

## 2022-09-01 RX ORDER — DIPHENHYDRAMINE HYDROCHLORIDE 50 MG/ML
25 INJECTION INTRAMUSCULAR; INTRAVENOUS EVERY 6 HOURS PRN
Status: ACTIVE | OUTPATIENT
Start: 2022-09-01 | End: 2022-09-02

## 2022-09-01 RX ORDER — SUMATRIPTAN 100 MG/1
100 TABLET, FILM COATED ORAL
COMMUNITY

## 2022-09-01 RX ORDER — OXYCODONE HYDROCHLORIDE 5 MG/1
10 TABLET ORAL EVERY 4 HOURS PRN
Status: DISCONTINUED | OUTPATIENT
Start: 2022-09-01 | End: 2022-09-05 | Stop reason: HOSPADM

## 2022-09-01 RX ORDER — BETAMETHASONE SODIUM PHOSPHATE AND BETAMETHASONE ACETATE 3; 3 MG/ML; MG/ML
12 INJECTION, SUSPENSION INTRA-ARTICULAR; INTRALESIONAL; INTRAMUSCULAR; SOFT TISSUE ONCE
Status: COMPLETED | OUTPATIENT
Start: 2022-09-01 | End: 2022-09-01

## 2022-09-01 RX ORDER — ONDANSETRON 2 MG/ML
INJECTION INTRAMUSCULAR; INTRAVENOUS PRN
Status: DISCONTINUED | OUTPATIENT
Start: 2022-09-01 | End: 2022-09-01 | Stop reason: SDUPTHER

## 2022-09-01 RX ORDER — ACETAMINOPHEN 325 MG/1
650 TABLET ORAL EVERY 4 HOURS PRN
Status: CANCELLED | OUTPATIENT
Start: 2022-09-01

## 2022-09-01 RX ORDER — BUTALBITAL, ACETAMINOPHEN AND CAFFEINE 50; 325; 40 MG/1; MG/1; MG/1
1 TABLET ORAL EVERY 4 HOURS PRN
COMMUNITY

## 2022-09-01 RX ORDER — OXYCODONE HYDROCHLORIDE 5 MG/1
5 TABLET ORAL EVERY 4 HOURS PRN
Status: DISCONTINUED | OUTPATIENT
Start: 2022-09-01 | End: 2022-09-05 | Stop reason: HOSPADM

## 2022-09-01 RX ORDER — PRENATAL WITH FERROUS FUM AND FOLIC ACID 3080; 920; 120; 400; 22; 1.84; 3; 20; 10; 1; 12; 200; 27; 25; 2 [IU]/1; [IU]/1; MG/1; [IU]/1; MG/1; MG/1; MG/1; MG/1; MG/1; MG/1; UG/1; MG/1; MG/1; MG/1; MG/1
1 TABLET ORAL DAILY
Status: DISCONTINUED | OUTPATIENT
Start: 2022-09-01 | End: 2022-09-05 | Stop reason: HOSPADM

## 2022-09-01 RX ORDER — DIPHENHYDRAMINE HCL 25 MG
25 TABLET ORAL EVERY 6 HOURS PRN
Status: ACTIVE | OUTPATIENT
Start: 2022-09-01 | End: 2022-09-02

## 2022-09-01 RX ORDER — SODIUM CHLORIDE 0.9 % (FLUSH) 0.9 %
10 SYRINGE (ML) INJECTION EVERY 12 HOURS SCHEDULED
Status: DISCONTINUED | OUTPATIENT
Start: 2022-09-01 | End: 2022-09-05 | Stop reason: HOSPADM

## 2022-09-01 RX ORDER — ONDANSETRON 2 MG/ML
INJECTION INTRAMUSCULAR; INTRAVENOUS
Status: COMPLETED
Start: 2022-09-01 | End: 2022-09-01

## 2022-09-01 RX ORDER — SODIUM CHLORIDE, SODIUM LACTATE, POTASSIUM CHLORIDE, AND CALCIUM CHLORIDE .6; .31; .03; .02 G/100ML; G/100ML; G/100ML; G/100ML
1000 INJECTION, SOLUTION INTRAVENOUS ONCE
Status: COMPLETED | OUTPATIENT
Start: 2022-09-01 | End: 2022-09-01

## 2022-09-01 RX ORDER — BUPIVACAINE HYDROCHLORIDE 7.5 MG/ML
INJECTION, SOLUTION INTRASPINAL PRN
Status: DISCONTINUED | OUTPATIENT
Start: 2022-09-01 | End: 2022-09-01 | Stop reason: SDUPTHER

## 2022-09-01 RX ORDER — SODIUM CHLORIDE 0.9 % (FLUSH) 0.9 %
5-40 SYRINGE (ML) INJECTION PRN
Status: DISCONTINUED | OUTPATIENT
Start: 2022-09-01 | End: 2022-09-05 | Stop reason: HOSPADM

## 2022-09-01 RX ORDER — MAGNESIUM SULFATE HEPTAHYDRATE 40 MG/ML
4000 INJECTION, SOLUTION INTRAVENOUS ONCE
Status: COMPLETED | OUTPATIENT
Start: 2022-09-01 | End: 2022-09-01

## 2022-09-01 RX ORDER — MAGNESIUM SULFATE IN WATER 40 MG/ML
INJECTION, SOLUTION INTRAVENOUS
Status: COMPLETED
Start: 2022-09-01 | End: 2022-09-01

## 2022-09-01 RX ORDER — MORPHINE SULFATE 1 MG/ML
INJECTION, SOLUTION EPIDURAL; INTRATHECAL; INTRAVENOUS PRN
Status: DISCONTINUED | OUTPATIENT
Start: 2022-09-01 | End: 2022-09-01

## 2022-09-01 RX ORDER — MORPHINE SULFATE 1 MG/ML
INJECTION, SOLUTION EPIDURAL; INTRATHECAL; INTRAVENOUS PRN
Status: DISCONTINUED | OUTPATIENT
Start: 2022-09-01 | End: 2022-09-01 | Stop reason: SDUPTHER

## 2022-09-01 RX ADMIN — Medication 100 MCG: at 15:00

## 2022-09-01 RX ADMIN — MAGNESIUM SULFATE HEPTAHYDRATE 4000 MG: 40 INJECTION, SOLUTION INTRAVENOUS at 12:01

## 2022-09-01 RX ADMIN — Medication 200 MCG: at 14:45

## 2022-09-01 RX ADMIN — BUPIVACAINE HYDROCHLORIDE 1.6 ML: 7.5 INJECTION, SOLUTION SUBARACHNOID at 14:35

## 2022-09-01 RX ADMIN — SODIUM CHLORIDE, POTASSIUM CHLORIDE, SODIUM LACTATE AND CALCIUM CHLORIDE: 600; 310; 30; 20 INJECTION, SOLUTION INTRAVENOUS at 12:00

## 2022-09-01 RX ADMIN — Medication 100 MCG: at 15:10

## 2022-09-01 RX ADMIN — ONDANSETRON 4 MG: 2 INJECTION INTRAMUSCULAR; INTRAVENOUS at 12:48

## 2022-09-01 RX ADMIN — SODIUM CITRATE AND CITRIC ACID MONOHYDRATE 30 ML: 500; 334 SOLUTION ORAL at 14:13

## 2022-09-01 RX ADMIN — Medication 100 MCG: at 14:47

## 2022-09-01 RX ADMIN — Medication 100 MCG: at 14:57

## 2022-09-01 RX ADMIN — BETAMETHASONE SODIUM PHOSPHATE AND BETAMETHASONE ACETATE 12 MG: 3; 3 INJECTION, SUSPENSION INTRA-ARTICULAR; INTRALESIONAL; INTRAMUSCULAR; SOFT TISSUE at 12:01

## 2022-09-01 RX ADMIN — Medication 200 MCG: at 14:39

## 2022-09-01 RX ADMIN — MAGNESIUM SULFATE HEPTAHYDRATE 2000 MG/HR: 40 INJECTION, SOLUTION INTRAVENOUS at 12:24

## 2022-09-01 RX ADMIN — SODIUM CHLORIDE, POTASSIUM CHLORIDE, SODIUM LACTATE AND CALCIUM CHLORIDE: 600; 310; 30; 20 INJECTION, SOLUTION INTRAVENOUS at 14:26

## 2022-09-01 RX ADMIN — MAGNESIUM SULFATE HEPTAHYDRATE 2000 MG/HR: 40 INJECTION, SOLUTION INTRAVENOUS at 23:19

## 2022-09-01 RX ADMIN — MORPHINE SULFATE 0.15 MG: 1 INJECTION, SOLUTION EPIDURAL; INTRATHECAL; INTRAVENOUS at 14:35

## 2022-09-01 RX ADMIN — Medication 100 MCG: at 14:54

## 2022-09-01 RX ADMIN — Medication 909 ML/HR: at 14:52

## 2022-09-01 RX ADMIN — Medication 200 MCG: at 14:40

## 2022-09-01 RX ADMIN — ONDANSETRON 4 MG: 2 INJECTION INTRAMUSCULAR; INTRAVENOUS at 14:52

## 2022-09-01 RX ADMIN — SODIUM CHLORIDE, POTASSIUM CHLORIDE, SODIUM LACTATE AND CALCIUM CHLORIDE: 600; 310; 30; 20 INJECTION, SOLUTION INTRAVENOUS at 15:38

## 2022-09-01 RX ADMIN — SODIUM CHLORIDE, POTASSIUM CHLORIDE, SODIUM LACTATE AND CALCIUM CHLORIDE 1000 ML: 600; 310; 30; 20 INJECTION, SOLUTION INTRAVENOUS at 13:04

## 2022-09-01 RX ADMIN — BUTORPHANOL TARTRATE 2 MG: 2 INJECTION, SOLUTION INTRAMUSCULAR; INTRAVENOUS at 07:06

## 2022-09-01 RX ADMIN — KETOROLAC TROMETHAMINE 15 MG: 30 INJECTION, SOLUTION INTRAMUSCULAR; INTRAVENOUS at 16:51

## 2022-09-01 RX ADMIN — Medication 200 MCG: at 15:05

## 2022-09-01 RX ADMIN — BETAMETHASONE SODIUM PHOSPHATE AND BETAMETHASONE ACETATE 12 MG: 3; 3 INJECTION, SUSPENSION INTRA-ARTICULAR; INTRALESIONAL; INTRAMUSCULAR at 12:01

## 2022-09-01 RX ADMIN — WATER 2000 MG: 1 INJECTION INTRAMUSCULAR; INTRAVENOUS; SUBCUTANEOUS at 14:12

## 2022-09-01 ASSESSMENT — PAIN DESCRIPTION - DESCRIPTORS: DESCRIPTORS: ACHING;DISCOMFORT;SORE

## 2022-09-01 ASSESSMENT — PAIN SCALES - GENERAL
PAINLEVEL_OUTOF10: 2
PAINLEVEL_OUTOF10: 9
PAINLEVEL_OUTOF10: 9

## 2022-09-01 ASSESSMENT — PAIN DESCRIPTION - ONSET: ONSET: PROGRESSIVE

## 2022-09-01 ASSESSMENT — LIFESTYLE VARIABLES: SMOKING_STATUS: 0

## 2022-09-01 ASSESSMENT — PAIN DESCRIPTION - PAIN TYPE: TYPE: SURGICAL PAIN

## 2022-09-01 ASSESSMENT — PAIN DESCRIPTION - LOCATION
LOCATION: ABDOMEN
LOCATION: HEAD

## 2022-09-01 ASSESSMENT — PAIN DESCRIPTION - ORIENTATION: ORIENTATION: LOWER

## 2022-09-01 ASSESSMENT — PAIN - FUNCTIONAL ASSESSMENT: PAIN_FUNCTIONAL_ASSESSMENT: ACTIVITIES ARE NOT PREVENTED

## 2022-09-01 NOTE — ANESTHESIA PROCEDURE NOTES
Spinal Block    Patient location during procedure: OR  End time: 9/1/2022 2:30 PM  Reason for block: primary anesthetic  Staffing  Performed: other anesthesia staff   Anesthesiologist: Tamera Padilla DO  Resident/CRNA: DANIEL Bates - MARCELLE  Other anesthesia staff: Brennon Coppola RN  Spinal Block  Patient position: sitting  Prep: ChloraPrep  Patient monitoring: continuous pulse ox, frequent blood pressure checks and cardiac monitor  Approach: midline  Location: L3/L4  Provider prep: mask and sterile gloves  Local infiltration: lidocaine  Needle  Needle type: Pencan   Needle gauge: 25 G  Needle length: 3.5 in  Assessment  Sensory level: T4  Swirl obtained: Yes  CSF: clear  Attempts: 1  Hemodynamics: stable  Preanesthetic Checklist  Completed: patient identified, IV checked, site marked, risks and benefits discussed, surgical/procedural consents, equipment checked, pre-op evaluation, timeout performed, anesthesia consent given, oxygen available, monitors applied/VS acknowledged, fire risk safety assessment completed and verbalized and blood product R/B/A discussed and consented

## 2022-09-01 NOTE — PROGRESS NOTES
Dr Beth Pinedo called and updated on orders received from [facility], agreeable to start Mag and celestone.  Patient to be c-sectioned at 1400

## 2022-09-01 NOTE — PROGRESS NOTES
Viable baby girl A born via csection by Dr. Dayton Escalante at 1694. Apgars 4/8. Infant being cared for by NICU staff in warmer.

## 2022-09-01 NOTE — PROCEDURES
Department of Obstetrics and Gynecology  [facility] SECTION  Operative Dictation    Indications: malpresentation - Mack Emilia Breech    Pre-operative Diagnosis: 32 week 6 day pregnancy. Post-operative Diagnosis: Living  infant(s), Male, and Female    Surgeon: Surekha Goldsmith MD     Assistants: López Aguirre MD; Elizabet Julian MD, PGY1-FP. Anesthesia: Spinal anesthesia    Procedure Details   The patient was seen in the Holding Room. The risks, benefits, complications, treatment options, and expected outcomes were discussed with the patient. The patient concurred with the proposed plan, giving informed consent. The site of surgery properly noted/marked. The patient was taken to Operating Room # 1, identified as Aryan Eddy and the procedure verified as  Delivery. A Time Out was held and the above information confirmed. After induction of anesthesia, the patient was draped and prepped in the usual sterile manner. A Pfannenstiel incision was made and carried down through the subcutaneous tissue to the fascia. Fascial incision was made and extended transversely. The fascia was  from the underlying rectus tissue superiorly and inferiorly. The peritoneum was identified and entered. Peritoneal incision was extended longitudinally. The utero-vesical peritoneal reflection was incised transversely and the bladder flap was bluntly freed from the lower uterine segment. A low transverse uterine incision was made. TWIN A:  Delivered from a [facility] at 1450 was a 4 lb 8 oz FEMALE  with Apgar scores of 4 at one minute and 8 at five minutes,   TWIN B:  Delivered from a [facility] presentation at 1451 was a 4 lb 9 oz MALE  with Apgar scores of 1 at one minute and 7 at five minutes, and 9 at ten minutes. After the umbilical cords were clamped and cut, a segment of cord was clamped and isolated for blood gases, after which cord blood was obtained for evaluation. The placenta was bipartite, removed intact and appeared grossly normal. It was sent to pathology with a suture around Twin A's Cord. The uterus was then exteriorized, the intrauterine cavity cleared of any residual clots or debris, and the uterine incision was closed in 2 layers with running locked sutures of 0 Vicryl. Hemostasis was attained. The Bladder flap was then re-approximated using 2-0 chromic suture. The uterus was then replaced intra-abdominally. The pelvic gutters were explored, cleared of any clot collection, and the anterior abdominal wall peritoneum was closed with 3-0 chromic suture. The fascia was then reapproximated with running sutures of 0 Vicryl. The subcutaneous tissue was then re-approximated with 3 interrupted sutures of 3-0 plain gut, and the skin was reapproximated with 4-0 Vicryl in a subcuticular fashion. Instrument, sponge, and needle counts were correct prior the abdominal closure and at the conclusion of the case. The skin was then dressed with DermaBond, and the patient left the operating room in stable condition, and was transferred to recovery room. Findings:  Clear fluid in each amniotic sac. Estimated Blood Loss:  450ml           Drains: Herbert           Total IV Fluids: 2000 ml           Specimens: placenta           Complications:  none           Condition: infants stable to special care nursery and mother stable    Disposition: PACU - hemodynamically stable. Attending Attestation: I performed the procedure.     Hannah Monique MD, MD, [address] [place] [address]

## 2022-09-01 NOTE — PROGRESS NOTES
Pt to L&D with complaints of severe migraine since yesterday. Pt pregnant with twins with good fetal movement, no LOF and no bleeding. Pt states she took Imitrex x2 yesterday and 1 dose Fioricet last evening with no relief.

## 2022-09-01 NOTE — ANESTHESIA PRE PROCEDURE
Department of Anesthesiology  Preprocedure Note       Name:  Phylicia Lanza   Age:  23 y.o.  :  1995                                          MRN:  [id number]         Date:  2022      Surgeon: Jen Radford):  Elle Roe MD    Procedure: Procedure(s):   SECTION    Medications prior to admission:   Prior to Admission medications    Medication Sig Start Date End Date Taking? Authorizing Provider   SUMAtriptan (IMITREX) 100 MG tablet Take 100 mg by mouth once as needed for Migraine   Yes Historical Provider, MD   butalbital-acetaminophen-caffeine (FIORICET, ESGIC) -40 MG per tablet Take 1 tablet by mouth every 4 hours as needed for Headaches   Yes Historical Provider, MD   vitamin C (ASCORBIC ACID) 500 MG tablet TAKE 1 TABLET BY MOUTH EVERY DAY 3/8/22   Katia Gaytan PA-C   ACETAMINOPHEN EXTRA STRENGTH 500 MG tablet TAKE 2 TABLETS BY MOUTH 2 TIMES DAILY AS NEEDED FOR PAIN  Patient not taking: No sig reported 22   SVEN Maier   Fremanezumab-vfrm (AJOVY) 225 MG/1.5ML SOAJ Inject 225 mg into the skin every 30 days   Patient not taking: No sig reported 21   Historical Provider, MD   ketorolac (TORADOL) 10 MG tablet Take 10 mg by mouth every 6 hours as needed  Patient not taking: No sig reported 10/7/21   Historical Provider, MD   Magnesium Oxide 500 MG TABS Take 500 mg by mouth daily 21   Historical Provider, MD   montelukast (SINGULAIR) 10 MG tablet Take 1 tablet by mouth nightly 10/28/21   Historical Provider, MD   Riboflavin (B-2) 100 MG TABS Take 1 tablet by mouth daily 21   Historical Provider, MD   UBRELVY 100 MG TABS Take 1 tablet by mouth as needed If needed repeat after 3 hours.  Max dose 3 daily  Patient not taking: Reported on 21   Historical Provider, MD   nortriptyline (PAMELOR) 75 MG capsule Take 75 mg by mouth nightly    Historical Provider, MD   loratadine (CLARITIN) 10 MG tablet Take 10 mg by mouth daily    Historical Provider, MD   famotidine (PEPCID) 40 MG tablet Take 40 mg by mouth every evening   Patient not taking: No sig reported    Historical Provider, MD   naproxen (NAPROSYN) 500 MG tablet Take 1 tablet by mouth 2 times daily (with meals) AS NEEDED FOR PAIN  Patient not taking: No sig reported 3/30/20   DANIEL Gunderson CNP   metoclopramide (REGLAN) 10 MG tablet Take 1 tablet by mouth 3 times daily as needed (HEADACHES, NAUSEA)  Patient not taking: No sig reported 3/30/20   Myranda RacerDANIEL CNP   albuterol (PROVENTIL HFA;VENTOLIN HFA) 108 (90 BASE) MCG/ACT inhaler Inhale 2 puffs into the lungs every 6 hours as needed for Wheezing.     Historical Provider, MD ROLAND every evening Birth control   Patient not taking: No sig reported    Historical Provider, MD       Current medications:    Current Facility-Administered Medications   Medication Dose Route Frequency Provider Last Rate Last Admin    magnesium sulfate 4000 mg in 100 mL IVPB premix  4,000 mg IntraVENous Once Nino Agrawal  mL/hr at 09/01/22 1201 4,000 mg at 09/01/22 1201    magnesium sulfate (62173 mg/500mL infusion)  2,000 mg/hr IntraVENous Continuous Nino Agrawal MD        magnesium sulfate 20 GM/500ML (38320 mg/500mL infusion)             lactated ringers infusion   IntraVENous Continuous Goldie Ho MD 75 mL/hr at 09/01/22 1207 NoRateChange at 09/01/22 1207    lactated ringers bolus  1,000 mL IntraVENous Once Goldie Ho MD        sodium chloride flush 0.9 % injection 10 mL  10 mL IntraVENous 2 times per day Goldie Ho MD        sodium chloride flush 0.9 % injection 10 mL  10 mL IntraVENous PRN Goldie Ho MD        0.9 % sodium chloride infusion   IntraVENous PRN Goldie Ho MD        citric acid-sodium citrate (BICITRA) solution 30 mL  30 mL Oral Once Goldie Ho MD        ceFAZolin (ANCEF) 2,000 mg in sterile water 20 mL IV syringe  2,000 mg IntraVENous Once Goldie Ho MD           Allergies: Allergies   Allergen Reactions    Latex     Azithromycin        Problem List:    Patient Active Problem List   Diagnosis Code    Closed fracture of right distal radius S52.501A    Migraine with acute onset aura G43. 109    Mild intermittent asthma without complication E19.68    Headache in pregnancy, antepartum O26.899, R51.9    Severe pre-eclampsia in third trimester O14.13    Dichorionic diamniotic twin pregnancy in third trimester O30.043    Primigravida in third trimester Z34.03    Abnormal LFTs R94.5    32 weeks gestation of pregnancy Z3A.32       Past Medical History:        Diagnosis Date    Asthma     Migraines        Past Surgical History:        Procedure Laterality Date    SEPTOPLASTY      WISDOM TOOTH EXTRACTION      WRIST FRACTURE SURGERY Right 12/2/2021    RIGHT DISTAL RADIUS OPEN REDUCTION INTERNAL FIXATION performed by Kenia Castellon MD at [address] [facility] History:    Social History     Tobacco Use    Smoking status: Never    Smokeless tobacco: Never   Substance Use Topics    Alcohol use: Not Currently     Comment: socially                                Counseling given: Not Answered      Vital Signs (Current):   Vitals:    09/01/22 0807 09/01/22 0827 09/01/22 0847 09/01/22 0907   BP: 123/84 130/86 135/86 131/89   Pulse: (!) 105 (!) 107 (!) 104 (!) 104   Resp:       Temp:       TempSrc:                                                  BP Readings from Last 3 Encounters:   09/01/22 131/89   12/02/21 115/62   12/02/21 119/88       NPO Status:                                                                                 BMI:   Wt Readings from Last 3 Encounters:   12/02/21 145 lb (65.8 kg)   11/30/21 145 lb (65.8 kg)   03/30/20 140 lb (63.5 kg)     There is no height or weight on file to calculate BMI.    CBC:   Lab Results   Component Value Date/Time    WBC 11.4 09/01/2022 06:54 AM    RBC 3.74 09/01/2022 06:54 AM    HGB 12.0 09/01/2022 06:54 AM    HCT 35.8 09/01/2022 06:54 AM    MCV 95.7 09/01/2022 06:54 AM    RDW 14.2 09/01/2022 06:54 AM     09/01/2022 06:54 AM       CMP:   Lab Results   Component Value Date/Time     09/01/2022 06:54 AM    K 3.7 09/01/2022 06:54 AM     09/01/2022 06:54 AM    CO2 18 09/01/2022 06:54 AM    BUN 11 09/01/2022 06:54 AM    CREATININE 1.1 09/01/2022 06:54 AM    GFRAA >60 09/01/2022 06:54 AM    LABGLOM >60 09/01/2022 06:54 AM    GLUCOSE 79 09/01/2022 06:54 AM    PROT 5.8 09/01/2022 06:54 AM    CALCIUM 9.3 09/01/2022 06:54 AM    BILITOT 0.4 09/01/2022 06:54 AM    ALKPHOS 309 09/01/2022 06:54 AM    AST 56 09/01/2022 06:54 AM    ALT 20 09/01/2022 06:54 AM       POC Tests: No results for input(s): POCGLU, POCNA, POCK, POCCL, POCBUN, POCHEMO, POCHCT in the last 72 hours. Coags:   Lab Results   Component Value Date/Time    PROTIME 12.4 12/02/2021 11:53 AM    INR 1.1 12/02/2021 11:53 AM       HCG (If Applicable):   Lab Results   Component Value Date    PREGTESTUR NEGATIVE 03/30/2020        ABGs: No results found for: PHART, PO2ART, RID6UJB, TNW3CZL, BEART, Z8TETZAL     Type & Screen (If Applicable):  No results found for: LABABO, LABRH    Drug/Infectious Status (If Applicable):  No results found for: HIV, HEPCAB    COVID-19 Screening (If Applicable): No results found for: COVID19        Anesthesia Evaluation  Patient summary reviewed and Nursing notes reviewed no history of anesthetic complications:   Airway: Mallampati: II  TM distance: >3 FB   Neck ROM: full  Mouth opening: > = 3 FB   Dental: normal exam         Pulmonary:normal exam  breath sounds clear to auscultation  (+) asthma: seasonal asthma,     (-) not a current smoker          Patient did not smoke on day of surgery. ROS comment: Last use of inhaler was months ago.    Cardiovascular:  Exercise tolerance: good (>4 METS),   (+) hypertension: severe,         Rhythm: regular  Rate: normal           Beta Blocker:  Not on Beta Blocker      ROS comment: Presently on magnesium for increased B/P. Neuro/Psych:   (+) headaches: migraine headaches,              ROS comment: States she has headache at present time. Had CT of head. Results were negative. GI/Hepatic/Renal: Neg GI/Hepatic/Renal ROS            Endo/Other: Negative Endo/Other ROS                    Abdominal:             Vascular: Other Findings:           Anesthesia Plan      general and spinal     ASA 2     (Discussed spinal and GA with patient and mother. Questions answered. Patient agrees to spinal with GA as back up.)      MIPS: Postoperative opioids intended and Prophylactic antiemetics administered. Anesthetic plan and risks discussed with patient and mother. Plan discussed with attending.                 CBC   Lab Results   Component Value Date/Time    WBC 11.4 09/01/2022 06:54 AM    RBC 3.74 09/01/2022 06:54 AM    HGB 12.0 09/01/2022 06:54 AM    HCT 35.8 09/01/2022 06:54 AM    MCV 95.7 09/01/2022 06:54 AM    RDW 14.2 09/01/2022 06:54 AM     09/01/2022 06:54 AM     CMP    Lab Results   Component Value Date/Time     09/01/2022 06:54 AM    K 3.7 09/01/2022 06:54 AM     09/01/2022 06:54 AM    CO2 18 09/01/2022 06:54 AM    BUN 11 09/01/2022 06:54 AM    CREATININE 1.1 09/01/2022 06:54 AM    GFRAA >60 09/01/2022 06:54 AM    LABGLOM >60 09/01/2022 06:54 AM    GLUCOSE 79 09/01/2022 06:54 AM    PROT 5.8 09/01/2022 06:54 AM    CALCIUM 9.3 09/01/2022 06:54 AM    BILITOT 0.4 09/01/2022 06:54 AM    ALKPHOS 309 09/01/2022 06:54 AM    AST 56 09/01/2022 06:54 AM    ALT 20 09/01/2022 06:54 AM     BMP    Lab Results   Component Value Date/Time     09/01/2022 06:54 AM    K 3.7 09/01/2022 06:54 AM     09/01/2022 06:54 AM    CO2 18 09/01/2022 06:54 AM    BUN 11 09/01/2022 06:54 AM    CREATININE 1.1 09/01/2022 06:54 AM    CALCIUM 9.3 09/01/2022 06:54 AM    GFRAA >60 09/01/2022 06:54 AM    LABGLOM >60 09/01/2022 06:54 AM    GLUCOSE 79 09/01/2022 06:54 AM     POCGlucose  Recent Labs     09/01/22  0654   GLUCOSE 79        Coags  Lab Results   Component Value Date/Time    PROTIME 12.4 12/02/2021 11:53 AM    INR 1.1 12/02/2021 11:53 AM       HCG (If Applicable)   Lab Results   Component Value Date    PREGTESTUR NEGATIVE 03/30/2020        ABGs   No results found for: PHART, PO2ART, ZJG3UIP, BHC9LQS, BEART, Y3DKELRN     Type & Screen (If Applicable)  Lab Results   Component Value Date    ABORH O POS 09/01/2022     Active Problem List with ICD10 Codes  Patient Active Problem List   Diagnosis Code    Closed fracture of right distal radius S52.501A    Migraine with acute onset aura G43. 109    Mild intermittent asthma without complication E76.07    Headache in pregnancy, antepartum O26.899, R51.9    Severe pre-eclampsia in third trimester O14.13    Dichorionic diamniotic twin pregnancy in third trimester O30.043    Primigravida in third trimester Z34.03    Abnormal LFTs R94.5    32 weeks gestation of pregnancy Z3A.32       DANIEL House CRNA  September 1, 2022  12:10 PM      DANIEL House CRNA   9/1/2022

## 2022-09-01 NOTE — LACTATION NOTE
First time mom. Educated mom on the importance of colostrum for her premature twins admitted to the NICU. Set up Symphony breast pump with Primo-Lacto Colostrum Collection System at bedside. Instructed on pump set use. Encouraged to pump every 2- 3 hrs for 15-20 min with hand massage for effective removal of colostrum. Reviewed milk storage and pump cleaning guidelines-gave printed information. Has bottles, labels, swabs. Instructed on timing and dating labels. Gave lactation phone number for support as needed. Assisted with pumping colostrum. Mom is requesting a double electric breast pump for home use.

## 2022-09-01 NOTE — CONSULTS
MATERNAL FETAL MEDICINE CONSULT    NAME: Hugo Estrada  MRN: [id number]            SERVICE DATE: 2022  SERVICE TIME: 10:07 AM  REQUESTING PROVIDER: Renelle Simmonds, MD         I was asked by James Lino MD to provide an inpatient consult for Hugo Estrada. As I am sure you will recall, Hugo Estrada is a 23 y.o. female,  at 28w1d with an ASHANTI of 10/23/2022, Date entered prior to episode creation dating method. Patient is here with the following problems:  Principal Problem:    Headache in pregnancy, antepartum  Resolved Problems:    * No resolved hospital problems. *      SUBJECTIVE:  HISTORY OF THE PRESENT ILLNESS:  HISTORY REVIEW  Past Medical History:   Diagnosis Date    Asthma     Migraines      Past Surgical History:   Procedure Laterality Date    SEPTOPLASTY      WISDOM TOOTH EXTRACTION      WRIST FRACTURE SURGERY Right 2021    RIGHT DISTAL RADIUS OPEN REDUCTION INTERNAL FIXATION performed by Robson Ruiz MD at [address] [place] [address]     History reviewed. No pertinent family history.   Social History     Socioeconomic History    Marital status:      Spouse name: Not on file    Number of children: Not on file    Years of education: Not on file    Highest education level: Not on file   Occupational History    Not on file   Tobacco Use    Smoking status: Never    Smokeless tobacco: Never   Vaping Use    Vaping Use: Never used   Substance and Sexual Activity    Alcohol use: Not Currently     Comment: socially    Drug use: No    Sexual activity: Yes     Partners: Male   Other Topics Concern    Not on file   Social History Narrative    Not on file     Social Determinants of Health     Financial Resource Strain: Not on file   Food Insecurity: Not on file   Transportation Needs: Not on file   Physical Activity: Not on file   Stress: Not on file   Social Connections: Not on file   Intimate Partner Violence: Not on file   Housing Stability: Not on file     OB History    Para Term  AB Living   1 0 0 0 0 0   SAB IAB Ectopic Molar Multiple Live Births   0 0 0 0 0 0      # Outcome Date GA Lbr Julián/2nd Weight Sex Delivery Anes PTL Lv   1 Current                  ALLERGIES: Latex and Azithromycin    CURRENT MEDS:          PRIOR TO ADMISSION MEDICATIONS:  Prior to Admission medications    Medication Sig Start Date End Date Taking? Authorizing Provider   SUMAtriptan (IMITREX) 100 MG tablet Take 100 mg by mouth once as needed for Migraine   Yes Historical Provider, MD   butalbital-acetaminophen-caffeine (FIORICET, ESGIC) -40 MG per tablet Take 1 tablet by mouth every 4 hours as needed for Headaches   Yes Historical Provider, MD   vitamin C (ASCORBIC ACID) 500 MG tablet TAKE 1 TABLET BY MOUTH EVERY DAY 3/8/22   Katia Gaytan PA-C   ACETAMINOPHEN EXTRA STRENGTH 500 MG tablet TAKE 2 TABLETS BY MOUTH 2 TIMES DAILY AS NEEDED FOR PAIN  Patient not taking: No sig reported 2/14/22   SVEN Gomes   Fremanezumab-vfrm (AJOVY) 225 MG/1.5ML SOAJ Inject 225 mg into the skin every 30 days   Patient not taking: No sig reported 5/28/21   Historical Provider, MD   ketorolac (TORADOL) 10 MG tablet Take 10 mg by mouth every 6 hours as needed  Patient not taking: No sig reported 10/7/21   Historical Provider, MD   Magnesium Oxide 500 MG TABS Take 500 mg by mouth daily 5/17/21   Historical Provider, MD   montelukast (SINGULAIR) 10 MG tablet Take 1 tablet by mouth nightly 10/28/21   Historical Provider, MD   Riboflavin (B-2) 100 MG TABS Take 1 tablet by mouth daily 11/9/21   Historical Provider, MD   UBRELVY 100 MG TABS Take 1 tablet by mouth as needed If needed repeat after 3 hours.  Max dose 3 daily  Patient not taking: Reported on 9/1/2022 11/20/21   Historical Provider, MD   nortriptyline (PAMELOR) 75 MG capsule Take 75 mg by mouth nightly    Historical Provider, MD   loratadine (CLARITIN) 10 MG tablet Take 10 mg by mouth daily    Historical Provider, MD   famotidine (PEPCID) 40 MG tablet Take 40 RDW 14.2 11.5 - 15.0 fL    Platelets 818 575 - 180 E9/L    MPV 10.8 7.0 - 12.0 fL   Comprehensive Metabolic Panel    Collection Time: 22  6:54 AM   Result Value Ref Range    Sodium 135 132 - 146 mmol/L    Potassium 3.7 3.5 - 5.0 mmol/L    Chloride 104 98 - 107 mmol/L    CO2 18 (L) 22 - 29 mmol/L    Anion Gap 13 7 - 16 mmol/L    Glucose 79 74 - 99 mg/dL    BUN 11 6 - 20 mg/dL    Creatinine 1.1 (H) 0.5 - 1.0 mg/dL    GFR Non-African American >60 >=60 mL/min/1.73    GFR African American >60     Calcium 9.3 8.6 - 10.2 mg/dL    Total Protein 5.8 (L) 6.4 - 8.3 g/dL    Albumin 3.0 (L) 3.5 - 5.2 g/dL    Total Bilirubin 0.4 0.0 - 1.2 mg/dL    Alkaline Phosphatase 309 (H) 35 - 104 U/L    ALT 20 0 - 32 U/L    AST 56 (H) 0 - 31 U/L   Uric Acid    Collection Time: 22  6:54 AM   Result Value Ref Range    Uric Acid, Serum 7.4 (H) 2.4 - 5.7 mg/dL   Urinalysis with Microscopic    Collection Time: 22  7:39 AM   Result Value Ref Range    Color, UA Yellow Straw/Yellow    Clarity, UA SL CLOUDY Clear    Glucose, Ur Negative Negative mg/dL    Bilirubin Urine Negative Negative    Ketones, Urine TRACE (A) Negative mg/dL    Specific Gravity, UA <=1.005 1.005 - 1.030    Blood, Urine Negative Negative    pH, UA 6.0 5.0 - 9.0    Protein, UA Negative Negative mg/dL    Urobilinogen, Urine 0.2 <2.0 E.U./dL    Nitrite, Urine Negative Negative    Leukocyte Esterase, Urine SMALL (A) Negative    WBC, UA 1-3 0 - 5 /HPF    RBC, UA 0-1 0 - 2 /HPF    Epithelial Cells, UA FEW /HPF    Bacteria, UA MODERATE (A) None Seen /HPF         IMP:  Duane Chisholm is a 23 y.o. non- non- female  at 28w1d with the worst headache or the second worst headache in her life. She also has right upper quadrant pain with abnormal AST. The platelet count is [phone]. Uric acid is 7.4 consistent with preeclampsia. The twins are in a breech breech presentation. Biophysical profiles are reassuring.       PLAN:  The first thing that needs to be done is to make sure the patient has not had a subarachnoid bleed. We will be getting a CAT scan with and without contrast.  If the patient does not have a subarachnoid bleed this is very consistent with preeclampsia with severe features and we need to start moving towards delivery. Celestone as soon as we know that there is not a subarachnoid hemorrhage. I would also start magnesium sulfate 4 g bolus with 2 g/h. I would repeat her preeclamptic labs in 12 hours. I would consider delivery tomorrow by  section because the babies are breech breech. If mom's condition changes I would move towards delivery sooner than that. I spent 45 minutes of direct contact time with the patient of which greater than 50% of the time was used to  the patient, discuss complications and problems related to her pregnancy, or coordinating her care. I answered all of her questions to her satisfaction. SIGNATURE: Paul Clemente MD  DATE: 2022  TIME: 10:07 AM  The CT was negative for sub arachnoid hemorrhage so give Celestone and start Magnesium sulfate.   Jocelyn Thorpe

## 2022-09-01 NOTE — PROGRESS NOTES
Dr Hans Gonzalez called and updated on labs, and that [facility] was scanning patient. Dr Delmar Mcdonald said he would call Hans Gonzalez with recommendation.

## 2022-09-01 NOTE — PROGRESS NOTES
Viable baby boy B born via csection by Dr. Emily Curran at 5312. Apgars 1/7/9. Infant being cared for by NICU staff in warmer.

## 2022-09-01 NOTE — H&P
Department of Obstetrics and Gynecology  Labor and Delivery  Nurse practitioner triage Note      SUBJECTIVE:  Regina Leong is a 23 y.o. female, , , Patient's last menstrual period was 2022.,Estimated Date of Delivery: 10/23/22, 32w4d, with the complaint of headache vs migraine since yesterday. Twin gestation, denies blurred vision, epigastric pain, or swelling. Reports nausea/vomiting. She states she had Fioricet and Imitrex twice yesterday and they did not improve the headache. Pt denies ctx, lof, vb or decreased fm. Prenatal course: twin gestation    MEDICAL HISTORY:      Diagnosis Date    Asthma     Migraines        SURGICAL HISTORY:      Procedure Laterality Date    SEPTOPLASTY      WISDOM TOOTH EXTRACTION      WRIST FRACTURE SURGERY Right 2021    RIGHT DISTAL RADIUS OPEN REDUCTION INTERNAL FIXATION performed by Kenia Castellon MD at [address] [place] [address]  History reviewed. No pertinent family history.     Medication prior to Admission:  Medications Prior to Admission: SUMAtriptan (IMITREX) 100 MG tablet, Take 100 mg by mouth once as needed for Migraine  butalbital-acetaminophen-caffeine (FIORICET, ESGIC) -40 MG per tablet, Take 1 tablet by mouth every 4 hours as needed for Headaches  vitamin C (ASCORBIC ACID) 500 MG tablet, TAKE 1 TABLET BY MOUTH EVERY DAY  ACETAMINOPHEN EXTRA STRENGTH 500 MG tablet, TAKE 2 TABLETS BY MOUTH 2 TIMES DAILY AS NEEDED FOR PAIN (Patient not taking: No sig reported)  Fremanezumab-vfrm (AJOVY) 225 MG/1.5ML SOAJ, Inject 225 mg into the skin every 30 days  (Patient not taking: No sig reported)  ketorolac (TORADOL) 10 MG tablet, Take 10 mg by mouth every 6 hours as needed (Patient not taking: No sig reported)  Magnesium Oxide 500 MG TABS, Take 500 mg by mouth daily  montelukast (SINGULAIR) 10 MG tablet, Take 1 tablet by mouth nightly  Riboflavin (B-2) 100 MG TABS, Take 1 tablet by mouth daily  UBRELVY 100 MG TABS, Take 1 tablet by mouth as needed If babies    Contraction frequency: 0 minutes      ASSESSMENT & PLAN:   Discussed with dr Juan Back  Cbc  Cmp  Uric acid  Urinalysis  Urine pr/cr ratio  Stadol iv once

## 2022-09-02 LAB
AMO_PENTOBARBITAL, QUANTITATIVE, URINE: <50
BUTABARBITAL URINE QUANT: <50
BUTALBITAL URINE QUANT: 750.8
COMMENT: NORMAL
HCT VFR BLD CALC: 31.2 % (ref 34–48)
HEMOGLOBIN: 10.5 G/DL (ref 11.5–15.5)
MCH RBC QN AUTO: 32.3 PG (ref 26–35)
MCHC RBC AUTO-ENTMCNC: 33.7 % (ref 32–34.5)
MCV RBC AUTO: 96 FL (ref 80–99.9)
PDW BLD-RTO: 13.9 FL (ref 11.5–15)
PHENOBARBITAL URINE QUANT: <50
PLATELET # BLD: 148 E9/L (ref 130–450)
PMV BLD AUTO: 10.9 FL (ref 7–12)
RBC # BLD: 3.25 E12/L (ref 3.5–5.5)
SECOBARBITAL URINE QUANT: <50
WBC # BLD: 19 E9/L (ref 4.5–11.5)

## 2022-09-02 PROCEDURE — 36415 COLL VENOUS BLD VENIPUNCTURE: CPT

## 2022-09-02 PROCEDURE — 85027 COMPLETE CBC AUTOMATED: CPT

## 2022-09-02 PROCEDURE — 6370000000 HC RX 637 (ALT 250 FOR IP): Performed by: OBSTETRICS & GYNECOLOGY

## 2022-09-02 PROCEDURE — 6360000002 HC RX W HCPCS: Performed by: OBSTETRICS & GYNECOLOGY

## 2022-09-02 PROCEDURE — 6360000002 HC RX W HCPCS: Performed by: ANESTHESIOLOGY

## 2022-09-02 PROCEDURE — 1220000000 HC SEMI PRIVATE OB R&B

## 2022-09-02 RX ADMIN — MAGNESIUM SULFATE HEPTAHYDRATE 2000 MG/HR: 40 INJECTION, SOLUTION INTRAVENOUS at 08:47

## 2022-09-02 RX ADMIN — OXYCODONE 10 MG: 5 TABLET ORAL at 15:44

## 2022-09-02 RX ADMIN — DOCUSATE SODIUM 100 MG: 100 CAPSULE, LIQUID FILLED ORAL at 08:39

## 2022-09-02 RX ADMIN — DOCUSATE SODIUM 100 MG: 100 CAPSULE, LIQUID FILLED ORAL at 23:57

## 2022-09-02 RX ADMIN — KETOROLAC TROMETHAMINE 15 MG: 30 INJECTION, SOLUTION INTRAMUSCULAR; INTRAVENOUS at 11:08

## 2022-09-02 RX ADMIN — METFORMIN HYDROCHLORIDE 1 TABLET: 500 TABLET, EXTENDED RELEASE ORAL at 08:39

## 2022-09-02 RX ADMIN — SIMETHICONE 80 MG: 80 TABLET, CHEWABLE ORAL at 23:57

## 2022-09-02 RX ADMIN — KETOROLAC TROMETHAMINE 15 MG: 30 INJECTION, SOLUTION INTRAMUSCULAR; INTRAVENOUS at 01:53

## 2022-09-02 RX ADMIN — Medication: at 23:56

## 2022-09-02 ASSESSMENT — PAIN DESCRIPTION - LOCATION: LOCATION: ABDOMEN;HEAD

## 2022-09-02 ASSESSMENT — PAIN - FUNCTIONAL ASSESSMENT: PAIN_FUNCTIONAL_ASSESSMENT: ACTIVITIES ARE NOT PREVENTED

## 2022-09-02 ASSESSMENT — PAIN SCALES - GENERAL
PAINLEVEL_OUTOF10: 4
PAINLEVEL_OUTOF10: 4
PAINLEVEL_OUTOF10: 7

## 2022-09-02 ASSESSMENT — PAIN DESCRIPTION - DESCRIPTORS: DESCRIPTORS: DISCOMFORT

## 2022-09-02 NOTE — PROGRESS NOTES
Rn explained to patient the importance of getting up and moving around when feeling better. Also explained that she needs to urinate in 6 hours after taking nettles out. Patient understands.

## 2022-09-02 NOTE — PROGRESS NOTES
Pt assisted to wheelchair to visit NICU. Pt felt lightheaded and dizzy, pt given juice and fluid and felt better.  Pt to visit NICU with RN

## 2022-09-02 NOTE — LACTATION NOTE
Mom reports feeling very sleepy while on Mag, hasn't felt like pumping again. Support provided and encouraged to call with any assistance needed. Discussed normal milk production and the importance of trying to maintain pumping routine to establish supply. EBP at bedside.

## 2022-09-02 NOTE — PROGRESS NOTES
RN at bedside linens given to pt, asked if pt is ready to get up to restroom, and pt states she would like to wait until after her dinner is done and she eats.

## 2022-09-02 NOTE — PROGRESS NOTES
Pt up to bathroom, handled very well, urinated, evelyne care done. Patient walked to wheelchair and off unit to NICU.

## 2022-09-03 LAB
HCT VFR BLD CALC: 27.8 % (ref 34–48)
HCT VFR BLD CALC: 30.9 % (ref 34–48)
HEMOGLOBIN: 10.1 G/DL (ref 11.5–15.5)
HEMOGLOBIN: 9.5 G/DL (ref 11.5–15.5)
MCH RBC QN AUTO: 32.2 PG (ref 26–35)
MCH RBC QN AUTO: 32.4 PG (ref 26–35)
MCHC RBC AUTO-ENTMCNC: 32.7 % (ref 32–34.5)
MCHC RBC AUTO-ENTMCNC: 34.2 % (ref 32–34.5)
MCV RBC AUTO: 94.2 FL (ref 80–99.9)
MCV RBC AUTO: 99 FL (ref 80–99.9)
PDW BLD-RTO: 14.6 FL (ref 11.5–15)
PDW BLD-RTO: 15.1 FL (ref 11.5–15)
PLATELET # BLD: 147 E9/L (ref 130–450)
PLATELET # BLD: 164 E9/L (ref 130–450)
PMV BLD AUTO: 10.3 FL (ref 7–12)
PMV BLD AUTO: 10.6 FL (ref 7–12)
RBC # BLD: 2.95 E12/L (ref 3.5–5.5)
RBC # BLD: 3.12 E12/L (ref 3.5–5.5)
WBC # BLD: 15.1 E9/L (ref 4.5–11.5)
WBC # BLD: 16.7 E9/L (ref 4.5–11.5)

## 2022-09-03 PROCEDURE — 6360000002 HC RX W HCPCS: Performed by: OBSTETRICS & GYNECOLOGY

## 2022-09-03 PROCEDURE — 6370000000 HC RX 637 (ALT 250 FOR IP): Performed by: OBSTETRICS & GYNECOLOGY

## 2022-09-03 PROCEDURE — 2580000003 HC RX 258: Performed by: OBSTETRICS & GYNECOLOGY

## 2022-09-03 PROCEDURE — 36415 COLL VENOUS BLD VENIPUNCTURE: CPT

## 2022-09-03 PROCEDURE — 85027 COMPLETE CBC AUTOMATED: CPT

## 2022-09-03 PROCEDURE — 1220000000 HC SEMI PRIVATE OB R&B

## 2022-09-03 RX ADMIN — WATER 1000 MG: 1 INJECTION INTRAMUSCULAR; INTRAVENOUS; SUBCUTANEOUS at 22:59

## 2022-09-03 RX ADMIN — METFORMIN HYDROCHLORIDE 1 TABLET: 500 TABLET, EXTENDED RELEASE ORAL at 08:28

## 2022-09-03 RX ADMIN — OXYCODONE 10 MG: 5 TABLET ORAL at 00:07

## 2022-09-03 RX ADMIN — DOCUSATE SODIUM 100 MG: 100 CAPSULE, LIQUID FILLED ORAL at 08:27

## 2022-09-03 RX ADMIN — FERROUS SULFATE TAB 325 MG (65 MG ELEMENTAL FE) 325 MG: 325 (65 FE) TAB at 08:27

## 2022-09-03 RX ADMIN — OXYCODONE 5 MG: 5 TABLET ORAL at 11:04

## 2022-09-03 RX ADMIN — IBUPROFEN 600 MG: 600 TABLET ORAL at 03:05

## 2022-09-03 RX ADMIN — FERROUS SULFATE TAB 325 MG (65 MG ELEMENTAL FE) 325 MG: 325 (65 FE) TAB at 18:51

## 2022-09-03 RX ADMIN — DOCUSATE SODIUM 100 MG: 100 CAPSULE, LIQUID FILLED ORAL at 19:49

## 2022-09-03 RX ADMIN — IBUPROFEN 600 MG: 600 TABLET ORAL at 19:49

## 2022-09-03 RX ADMIN — OXYCODONE 10 MG: 5 TABLET ORAL at 06:01

## 2022-09-03 RX ADMIN — WATER 1000 MG: 1 INJECTION INTRAMUSCULAR; INTRAVENOUS; SUBCUTANEOUS at 11:01

## 2022-09-03 ASSESSMENT — PAIN DESCRIPTION - LOCATION
LOCATION: INCISION;HEAD
LOCATION: INCISION;HEAD
LOCATION: INCISION;BACK
LOCATION: HEAD

## 2022-09-03 ASSESSMENT — PAIN SCALES - GENERAL
PAINLEVEL_OUTOF10: 5
PAINLEVEL_OUTOF10: 6
PAINLEVEL_OUTOF10: 7
PAINLEVEL_OUTOF10: 7
PAINLEVEL_OUTOF10: 5

## 2022-09-03 ASSESSMENT — PAIN DESCRIPTION - DESCRIPTORS
DESCRIPTORS: THROBBING
DESCRIPTORS: ACHING
DESCRIPTORS: ACHING;BURNING
DESCRIPTORS: ACHING;BURNING

## 2022-09-03 ASSESSMENT — PAIN - FUNCTIONAL ASSESSMENT
PAIN_FUNCTIONAL_ASSESSMENT: PREVENTS OR INTERFERES SOME ACTIVE ACTIVITIES AND ADLS

## 2022-09-03 ASSESSMENT — PAIN DESCRIPTION - ORIENTATION
ORIENTATION: ANTERIOR;LOWER
ORIENTATION: ANTERIOR;LOWER;UPPER

## 2022-09-03 NOTE — PROGRESS NOTES
Patient called rn via telephone at 36 due to feeling a clot come out in per pad while in bed, rn stated she would be over to assess patients fundus and bleeding and the clot its self. RN in room at 0550 fundus assessed was at firm with massage at the umbilicus with 1 quarter size clot on evelyne pad with small amount of blood on pad and moderate amount of blood on green under pad like to the blood leaked from pad and underwear to green pad. Pad then changed in bed for pt. Pt then stated she felt another clot com out on to new pad at 0556 rn then assessed pad to find a ping pong ball sized clot on pad with small rubra amount of blood, rn again massaged fundus to assess where it was in abdomen and it was firm at  umbilicus, pad changed once again. Pt stated she would like to use Bathroom 0624, rn assisted pt there and helped with evelyne care, pt then voided, while pt finished evelyne care rn changed green pad on bed and replaced with a new one. Rn assisted pt back to bed, call bell within reach as well as room phone and bedside table and bed in low position.

## 2022-09-03 NOTE — PROGRESS NOTES
Attending C/Section  Post-Partum Progress Note    Post-Op Day #: 2    Patient is a 23 y.o. female with LMP: Patient's last menstrual period was 01/12/2022. and ASHANTI: Estimated Date of Delivery: None noted. .    SUBJECTIVE:   No Complaints. OBJECTIVE:    Abdomen:  Abdomen soft, non-tender. BS normal. No masses. Mild Uterine Fundal Tenderness. Incision: dry     ASSESSMENT:   normal postpartum exam      PLAN:     Routine Post-Op Care.  Begin rocephin 1 gm q 12 h.    Gisel Chapin MD, [facility]

## 2022-09-03 NOTE — PROGRESS NOTES
Dr. Beth Pinedo in facility this am to round, new order for Rocephin 1gm BID IV, obtain a CBC on 9/3/22 at 2200 pm. Patient continues to have BLLE swelling with +2 non pitting edema noted. Patient complained of headache that she reported was a migraine, per Dr. Beth Pinedo ok for patient to take immitrex from home  for migraine. Patient notified. Incision remains intact, up to bathroom with x1 assist. Will continue to monitor.

## 2022-09-03 NOTE — LACTATION NOTE
Mom feeling better today, pumped once yesterday. Encouraged mom to pump every 2-3 hours to stimulate production, has supplies needed. Parvez pump given for home use. Support provided and encouraged to call with any needs.

## 2022-09-03 NOTE — PROGRESS NOTES
Attending C/Section  Post-Partum Progress Note    Post-Op Day #: 1    Patient is a 23 y.o. female with LMP: Patient's last menstrual period was 01/12/2022. and ASHANTI: Estimated Date of Delivery: None noted. .    SUBJECTIVE:   No Complaints. Sore. OBJECTIVE:    Abdomen:  Abdomen soft, non-tender. BS normal. No masses   Incision: clean, dry, and intact     ASSESSMENT:   normal postpartum exam and normal post-operative exam      PLAN:     Routine Post-Op Care.      Andrew Christian MD, [facility] [place]

## 2022-09-03 NOTE — PROGRESS NOTES
RN in pt's room at this time, no pt present still down in nicu with fob, at this time tj belle  changed bed fully per requested by patient which then was expressed from the day turn RN LUIS EDUARDO Deshpande at shift report at 1. Rn also cleaned up room and removed clutter, stocked bathroom with linen, pads, evelyne bottle, underwear bc patient was all out. RN left a sticky note on newly made bed to have patient call rn on work cell phone number provided so when she returns to room rn will know to then come assess her and medicate.

## 2022-09-04 LAB
ALBUMIN SERPL-MCNC: 2.5 G/DL (ref 3.5–5.2)
ALP BLD-CCNC: 154 U/L (ref 35–104)
ALT SERPL-CCNC: 11 U/L (ref 0–32)
ANION GAP SERPL CALCULATED.3IONS-SCNC: 8 MMOL/L (ref 7–16)
AST SERPL-CCNC: 28 U/L (ref 0–31)
BILIRUB SERPL-MCNC: <0.2 MG/DL (ref 0–1.2)
BUN BLDV-MCNC: 11 MG/DL (ref 6–20)
CALCIUM SERPL-MCNC: 8.4 MG/DL (ref 8.6–10.2)
CHLORIDE BLD-SCNC: 103 MMOL/L (ref 98–107)
CO2: 27 MMOL/L (ref 22–29)
CREAT SERPL-MCNC: 0.9 MG/DL (ref 0.5–1)
GFR AFRICAN AMERICAN: >60
GFR NON-AFRICAN AMERICAN: >60 ML/MIN/1.73
GLUCOSE BLD-MCNC: 126 MG/DL (ref 74–99)
HCT VFR BLD CALC: 29.7 % (ref 34–48)
HEMOGLOBIN: 9.6 G/DL (ref 11.5–15.5)
MCH RBC QN AUTO: 31.9 PG (ref 26–35)
MCHC RBC AUTO-ENTMCNC: 32.3 % (ref 32–34.5)
MCV RBC AUTO: 98.7 FL (ref 80–99.9)
PDW BLD-RTO: 15 FL (ref 11.5–15)
PLATELET # BLD: 159 E9/L (ref 130–450)
PMV BLD AUTO: 10.1 FL (ref 7–12)
POTASSIUM SERPL-SCNC: 4.3 MMOL/L (ref 3.5–5)
RBC # BLD: 3.01 E12/L (ref 3.5–5.5)
SODIUM BLD-SCNC: 138 MMOL/L (ref 132–146)
TOTAL PROTEIN: 5 G/DL (ref 6.4–8.3)
WBC # BLD: 13.8 E9/L (ref 4.5–11.5)

## 2022-09-04 PROCEDURE — 36415 COLL VENOUS BLD VENIPUNCTURE: CPT

## 2022-09-04 PROCEDURE — 6370000000 HC RX 637 (ALT 250 FOR IP): Performed by: OBSTETRICS & GYNECOLOGY

## 2022-09-04 PROCEDURE — 1220000000 HC SEMI PRIVATE OB R&B

## 2022-09-04 PROCEDURE — 2580000003 HC RX 258: Performed by: OBSTETRICS & GYNECOLOGY

## 2022-09-04 PROCEDURE — 6360000002 HC RX W HCPCS: Performed by: OBSTETRICS & GYNECOLOGY

## 2022-09-04 PROCEDURE — 80053 COMPREHEN METABOLIC PANEL: CPT

## 2022-09-04 PROCEDURE — 85027 COMPLETE CBC AUTOMATED: CPT

## 2022-09-04 RX ORDER — NIFEDIPINE 30 MG/1
60 TABLET, FILM COATED, EXTENDED RELEASE ORAL DAILY
Status: DISCONTINUED | OUTPATIENT
Start: 2022-09-04 | End: 2022-09-05 | Stop reason: HOSPADM

## 2022-09-04 RX ADMIN — METFORMIN HYDROCHLORIDE 1 TABLET: 500 TABLET, EXTENDED RELEASE ORAL at 09:01

## 2022-09-04 RX ADMIN — SIMETHICONE 80 MG: 80 TABLET, CHEWABLE ORAL at 15:04

## 2022-09-04 RX ADMIN — NIFEDIPINE 60 MG: 30 TABLET, EXTENDED RELEASE ORAL at 11:28

## 2022-09-04 RX ADMIN — WATER 1000 MG: 1 INJECTION INTRAMUSCULAR; INTRAVENOUS; SUBCUTANEOUS at 21:38

## 2022-09-04 RX ADMIN — WATER 1000 MG: 1 INJECTION INTRAMUSCULAR; INTRAVENOUS; SUBCUTANEOUS at 11:27

## 2022-09-04 RX ADMIN — FERROUS SULFATE TAB 325 MG (65 MG ELEMENTAL FE) 325 MG: 325 (65 FE) TAB at 15:03

## 2022-09-04 RX ADMIN — SIMETHICONE 80 MG: 80 TABLET, CHEWABLE ORAL at 09:01

## 2022-09-04 RX ADMIN — OXYCODONE 5 MG: 5 TABLET ORAL at 09:01

## 2022-09-04 RX ADMIN — IBUPROFEN 600 MG: 600 TABLET ORAL at 15:04

## 2022-09-04 RX ADMIN — DOCUSATE SODIUM 100 MG: 100 CAPSULE, LIQUID FILLED ORAL at 09:01

## 2022-09-04 RX ADMIN — DOCUSATE SODIUM 100 MG: 100 CAPSULE, LIQUID FILLED ORAL at 21:38

## 2022-09-04 RX ADMIN — SODIUM CHLORIDE, PRESERVATIVE FREE 10 ML: 5 INJECTION INTRAVENOUS at 11:29

## 2022-09-04 RX ADMIN — IBUPROFEN 600 MG: 600 TABLET ORAL at 04:10

## 2022-09-04 ASSESSMENT — PAIN DESCRIPTION - ORIENTATION
ORIENTATION: LOWER
ORIENTATION: LOWER

## 2022-09-04 ASSESSMENT — PAIN SCALES - GENERAL
PAINLEVEL_OUTOF10: 5
PAINLEVEL_OUTOF10: 6
PAINLEVEL_OUTOF10: 7

## 2022-09-04 ASSESSMENT — PAIN DESCRIPTION - DESCRIPTORS
DESCRIPTORS: SORE;TENDER
DESCRIPTORS: SORE;TENDER;CRAMPING

## 2022-09-04 ASSESSMENT — PAIN - FUNCTIONAL ASSESSMENT
PAIN_FUNCTIONAL_ASSESSMENT: ACTIVITIES ARE NOT PREVENTED
PAIN_FUNCTIONAL_ASSESSMENT: ACTIVITIES ARE NOT PREVENTED

## 2022-09-04 ASSESSMENT — PAIN DESCRIPTION - LOCATION
LOCATION: ABDOMEN
LOCATION: ABDOMEN

## 2022-09-04 NOTE — PROGRESS NOTES
Mom currently in NICU support person is back in the room. Support person stated mother will be on her way back up shortly.

## 2022-09-04 NOTE — PROGRESS NOTES
Mom and support person is going to the NICU at this time. Advised mom she had blood work that is due at 10pm and an antibiotic that is also due. Mom stated she will be back at ten.

## 2022-09-04 NOTE — PROGRESS NOTES
Dr. Sarabjit Gonzalez called and made aware of Lab results and pt's B/P. To continue with IV rocephin  and procardia per order.

## 2022-09-04 NOTE — PROGRESS NOTES
Pt refusing MMR. increased lateral swaying, decreased trunk rotation, wide EMERALD/decreased jenelle

## 2022-09-04 NOTE — PROGRESS NOTES
Attending C/Section  Post-Partum Progress Note    Post-Op Day #: 3    Patient is a 23 y.o. female with LMP: Patient's last menstrual period was 01/12/2022. and ASHANTI: Estimated Date of Delivery: None noted. .    SUBJECTIVE:   Sore. OBJECTIVE:    Abdomen:  Abdomen soft, non-tender. BS normal. No masses   Incision: dry and intact     ASSESSMENT:   normal postpartum exam, normal post-operative exam, and persistent BP Elevation. PLAN:     Routine Post-Op Care. Recheck Metabloic Profile.   Begin Procardia 60 mg XL 1 q am    Anel Pal MD, [facility]

## 2022-09-04 NOTE — DISCHARGE INSTRUCTIONS
Tub bath in 6 weeks  You may take a shower  Driving 2-3 weeks  Sexual activity 6 weeks  Work/School  6 weeks  Walking  As tolerated  Stairs as tolerated  Lifting no heavy lifting Follow-up with your OB doctor in 1 week if  delivery or in  6 weeks for vaginal delivery unless otherwise instructed. Call office for an appointment. For breastfeeding support, you can contact our lactation specialists at [phone], [phone], or [phone]. DIET  Eat a well balanced diet focusing on foods high in fiber and protein  Drink plenty of fluids especially water. To avoid constipation you may take a mild stool softener as recommended by your doctor or midwife. ACTIVITY  Gradually increase your activity. Resume exercise regimen only after advised by your doctor or midwife. Avoid lifting anything heavier than your baby or a gallon of milk for SIX weeks. Avoid driving until your doctor or midwife has given their approval.  [place] slowly from a lying to sitting and then a standing position. Climb stairs one at a time. Use caution when carrying your baby up and down the stairs. No sexual activity for 6 weeks or until advised by your doctor - Nothing in vagina: intercourse, tampons, or douching. Be prepared to discuss family planning at your follow-up OB visit. You may feel tired or have a lack of energy. You may continue your prenatal vitamin to replenish nutrients post delivery. Nap when baby naps to catch up on sleep. May return to work or school in 6 weeks or as directed by OB. EMOTIONS  You may feed tariq, sad, teary, & overwhelmed. Contact your OB provider if you feel you may be showing signs of postpartum depression, or have thoughts of harming yourself or your infant. If infant will not stop crying, contact another adult for help or place infant in their crib on their back and take a break. NEVER shake your infant.       BLEEDING  Vaginal bleeding will decrease in amount over the next few weeks. You will notice that as your activity increases, your flow may increase. This is your body's way of telling you, you need to take things easier and rest more often. Call your OB/ER if you are saturating more than one maxi pad in an hour. BREAST CARE  Take medications as recommended by your doctor or midwife for pain  If you develop a warm, red, tender area on your breast or develop a fever contact your OB provider. For breastfeeding moms:  If you become engorged, feeding may be more difficult or painful for 1-2 days. You may find it helpful to hand express some milk so that the infant can latch on more easily. While breastfeeding, continue to take your prenatal vitamins as directed by your doctor or midwife. Only take medications verified as safe for breastfeeding. For non-breastfeeding moms:  You may apply ice packs to your breasts over your bra for twenty minutes at a time for comfort. Avoid stimulation to your breasts, when showering allow the water to strike your back not your breasts. Wear a good fitting bra until your milk dries, such as a sports bra. INCISIONAL CARE / NEHA CARE  Clean your incision in the shower with mild soap. After shower pat the incision area dry and leave open to air. If used, Steri-stipes should be removed by 2 weeks. If used, Fredick Kiss should be removed by the OB in office by 1 week. If used/ordered, an abdominal binder may provide support for your incision. Use the neha-bottle after toileting until bleeding stops. Cleanse your perineum from front to back  If used, stitches or internal clips will dissolve in 4-6 weeks. You may use a sitz bath or soak in a clean tub as needed for comfort. Kegel exercises will help restore bladder control. SWELLING  Keep your legs elevated when sitting or lying. When wearing stocking or socks, make sure they are not too tight. WHEN TO CALL THE DOCTOR  If you have a temp of 100.4 or more.    If your bleeding has increased and you are saturating a pad in an hour. Your abdomen is tender to touch. You are passing blood clots bigger than the size of a lemon. If you are experiencing extreme weakness or dizziness. If you are having flu-like symptoms such as achy muscles or joints. There is a foul smell or a green color to your vaginal bleeding. If you have pain that cannot be relieved. You have persistent burning with urination or frequency. Call if you have concerns about your well-being. You are unable to sleep, eat, or are having thoughts of harming yourself or your baby. You have swelling, bleeding, drainage, foul odor, redness, or warmth in/around your incision or stitches. You have a red, warm, tender area in you calf.

## 2022-09-04 NOTE — PROGRESS NOTES
Pt back from NICU and stating she feels drowsy and shakey. Pt back to bed and VSS- -/62 with pulse ox of 98. Up to BR to void-voided qs.

## 2022-09-04 NOTE — PROGRESS NOTES
Pt watching TV while in bed and conversing with BF-states she's feeling better in bed with head of bed lowered slightly.

## 2022-09-04 NOTE — LACTATION NOTE
Mom pumped three times yesterday, getting drops of colostrum. Encouraged mom to pump every 2-3 hours to stimulate production. Support provided and encouraged to call with any needs.

## 2022-09-05 VITALS
OXYGEN SATURATION: 96 % | SYSTOLIC BLOOD PRESSURE: 117 MMHG | TEMPERATURE: 98.8 F | RESPIRATION RATE: 16 BRPM | DIASTOLIC BLOOD PRESSURE: 71 MMHG | HEART RATE: 67 BPM

## 2022-09-05 PROBLEM — Z3A.32 32 WEEKS GESTATION OF PREGNANCY: Status: RESOLVED | Noted: 2022-09-01 | Resolved: 2022-09-05

## 2022-09-05 LAB
C. TRACHOMATIS DNA ,URINE: NEGATIVE
N. GONORRHOEAE DNA, URINE: NEGATIVE
SOURCE: NORMAL

## 2022-09-05 PROCEDURE — 6370000000 HC RX 637 (ALT 250 FOR IP): Performed by: OBSTETRICS & GYNECOLOGY

## 2022-09-05 PROCEDURE — 2580000003 HC RX 258: Performed by: OBSTETRICS & GYNECOLOGY

## 2022-09-05 PROCEDURE — 6360000002 HC RX W HCPCS: Performed by: OBSTETRICS & GYNECOLOGY

## 2022-09-05 RX ORDER — IBUPROFEN 600 MG/1
600 TABLET ORAL EVERY 6 HOURS PRN
Qty: 60 TABLET | Refills: 1 | Status: SHIPPED | OUTPATIENT
Start: 2022-09-05

## 2022-09-05 RX ORDER — OXYCODONE HYDROCHLORIDE 5 MG/1
5 TABLET ORAL EVERY 6 HOURS PRN
Qty: 20 TABLET | Refills: 0 | Status: SHIPPED | OUTPATIENT
Start: 2022-09-05 | End: 2022-09-10

## 2022-09-05 RX ADMIN — IBUPROFEN 600 MG: 600 TABLET ORAL at 16:32

## 2022-09-05 RX ADMIN — METFORMIN HYDROCHLORIDE 1 TABLET: 500 TABLET, EXTENDED RELEASE ORAL at 08:12

## 2022-09-05 RX ADMIN — FERROUS SULFATE TAB 325 MG (65 MG ELEMENTAL FE) 325 MG: 325 (65 FE) TAB at 09:15

## 2022-09-05 RX ADMIN — OXYCODONE 10 MG: 5 TABLET ORAL at 09:20

## 2022-09-05 RX ADMIN — DOCUSATE SODIUM 100 MG: 100 CAPSULE, LIQUID FILLED ORAL at 09:15

## 2022-09-05 RX ADMIN — WATER 1000 MG: 1 INJECTION INTRAMUSCULAR; INTRAVENOUS; SUBCUTANEOUS at 09:15

## 2022-09-05 RX ADMIN — IBUPROFEN 600 MG: 600 TABLET ORAL at 01:24

## 2022-09-05 ASSESSMENT — PAIN - FUNCTIONAL ASSESSMENT: PAIN_FUNCTIONAL_ASSESSMENT: ACTIVITIES ARE NOT PREVENTED

## 2022-09-05 ASSESSMENT — PAIN DESCRIPTION - LOCATION: LOCATION: BACK

## 2022-09-05 ASSESSMENT — PAIN SCALES - GENERAL
PAINLEVEL_OUTOF10: 4
PAINLEVEL_OUTOF10: 4
PAINLEVEL_OUTOF10: 5

## 2022-09-05 ASSESSMENT — PAIN DESCRIPTION - DESCRIPTORS: DESCRIPTORS: SORE

## 2022-09-05 NOTE — PROGRESS NOTES
Attending C/Section  Post-Partum Progress Note    Post-Op Day #: 3    Patient is a 23 y.o. female with LMP: Patient's last menstrual period was 01/12/2022. and ASHANTI: Estimated Date of Delivery: 10/19/22. SUBJECTIVE:   Pt/ states 20 minutes after taking Procardia 60mg XL Yesterday, she felt nauseated. Prefers to stop it. OBJECTIVE:    Abdomen:  Abdomen soft, non-tender. BS normal. No masses. Incision: clean, dry, and intact     ASSESSMENT:   normal postpartum exam and normal post-operative exam      PLAN:     Routine Post-Op Care. BP Monitoring at Home. Call if Diastolic above 90. Home on Roxicodone and motrin. Has Fe at Home.     Baylee Bellamy MD, [address] [facility]

## 2022-09-05 NOTE — PROGRESS NOTES
Pt resting in bed; pt states taking general diet w/o nausea, passing gas, had BM and pain meds effective; declines procardia stating it made her feel \"awful\" and \"nausesated\"; states she wants to go home today.

## 2022-09-05 NOTE — PROGRESS NOTES
Pt states ready for discharge; pt discharged in apparently stable condition to home; infant remains in NICU.

## 2022-09-05 NOTE — DISCHARGE SUMMARY
Department of Obstetrics & Gynecology   Section  DISCHARGE SUMMARY    Stella Jason is a 23y.o. year old  female. ASHANTI: Estimated Date of Delivery: 10/19/22     Gestational Age: 33w1d    Admitted on: 2022     Admitting Diagnosis: Headache in pregnancy, antepartum [O26.899, R51.9]  32 weeks gestation of pregnancy [Z3A.32]    PAST OB HISTORY  OB History          1    Para   1    Term                AB        Living   2         SAB        IAB        Ectopic        Molar        Multiple   1    Live Births   2                Date of Delivery:   Information for the patient's :  Zari Dasilvantmary De La Cruzg [[id number]]   2022   Information for the patient's :  Gita Suresh [[id number]]   2022      Delivery Type: Information for the patient's :  Zari Abreu Clenton Mic [[id number]]   Delivery Method: , Low Transverse   Information for the patient's :  Gita Suresh [[id number]]   Delivery Method: , Low Transverse     Indications for  Section: Breech Presentation, Severe PreEclampsia.     Anesthesia: Spinal     Information:   GENDER:   Information for the patient's :  Zari Abreu Clenton Mic [[id number]]   female   Information for the patient's :  Gita Suresh [[id number]]   male    BIRTH WEIGHT:   Information for the patient's :  Zari Dasilvanton Mic [[id number]]   Birth Weight: 4 lb 8.3 oz (2.05 kg)   Information for the patient's :  Gita Suresh [[id number]]   Birth Weight: 4 lb 9.2 oz (2.075 kg)    APGARS:    Information for the patient's :  Zari Abreu Clenton Mic [[id number]]   APGAR One: 4   Information for the patient's :  Gita Suresh [[id number]]   APGAR One: N/A ,   Information for the patient's :  Zari Dasilvanton Mic [[id number]]   APGAR Five: 8   Information for the patient's :  Gita Suresh [[id number]]   APGAR Five: N/A     Intrapartum complications: Non     Post-Partum Hospital Course/Complications: Uneventful    Discharge Date: 9/5/22 to Home in Good Condition. Discharge Medications:      Medication List        START taking these medications      ibuprofen 600 MG tablet  Commonly known as: ADVIL;MOTRIN  Take 1 tablet by mouth every 6 hours as needed for Pain     oxyCODONE 5 MG immediate release tablet  Commonly known as: ROXICODONE  Take 1 tablet by mouth every 6 hours as needed for Pain for up to 5 days. CONTINUE taking these medications      albuterol sulfate  (90 Base) MCG/ACT inhaler  Commonly known as: PROVENTIL;VENTOLIN;PROAIR     B-2 100 MG Tabs     butalbital-acetaminophen-caffeine -40 MG per tablet  Commonly known as: FIORICET, ESGIC     loratadine 10 MG tablet  Commonly known as: CLARITIN     Magnesium Oxide 500 MG Tabs     montelukast 10 MG tablet  Commonly known as: SINGULAIR     nortriptyline 75 MG capsule  Commonly known as: PAMELOR     SUMAtriptan 100 MG tablet  Commonly known as: IMITREX     vitamin C 500 MG tablet  Commonly known as: ASCORBIC ACID  TAKE 1 TABLET BY MOUTH EVERY DAY            STOP taking these medications      Acetaminophen Extra Strength 500 MG tablet  Generic drug: acetaminophen     Ajovy 225 MG/1.5ML Soaj  Generic drug: Fremanezumab-vfrm     famotidine 40 MG tablet  Commonly known as: PEPCID     ketorolac 10 MG tablet  Commonly known as: TORADOL     metoclopramide 10 MG tablet  Commonly known as: Reglan     naproxen 500 MG tablet  Commonly known as: Naprosyn     NONFORMULARY     Ubrelvy 100 MG Tabs  Generic drug: Ubrogepant               Where to Get Your Medications        You can get these medications from any pharmacy    Bring a paper prescription for each of these medications  ibuprofen 600 MG tablet  oxyCODONE 5 MG immediate release tablet         Follow-up Plan:  Appointment with Bebe Tinoco MD, MD in 7-10 days.     Bebe Tinoco MD, [address] [facility]  Obstetrics & Gynecology

## 2022-09-06 NOTE — ANESTHESIA POSTPROCEDURE EVALUATION
Department of Anesthesiology  Postprocedure Note    Patient: Myriam Khan  MRN: [id number]  YOB: 1995  Date of evaluation: 2022      Procedure Summary     Date: 22 Room / Location: [facility] OR   [facility]    Anesthesia Start: 6549 Anesthesia Stop: 6211    Procedure:  SECTION (Uterus) Diagnosis:       S/P primary low transverse       (S/P primary low transverse  [Q39.071])    Surgeons: Keyanna Archuleta MD Responsible Provider: Carmela Jimenez DO    Anesthesia Type: general, spinal ASA Status: 2          Anesthesia Type: No value filed.     Janie Phase I: Janie Score: 9    Janie Phase II:        Anesthesia Post Evaluation    Patient location during evaluation: PACU  Patient participation: complete - patient participated  Level of consciousness: awake and alert  Airway patency: patent  Nausea & Vomiting: no nausea and no vomiting  Complications: no  Cardiovascular status: hemodynamically stable  Respiratory status: acceptable  Hydration status: euvolemic

## 2022-09-14 ENCOUNTER — HOSPITAL ENCOUNTER (OUTPATIENT)
Age: 27
Discharge: HOME | End: 2022-09-14
Payer: COMMERCIAL

## 2022-09-14 LAB
BASOPHILS ABSOLUTE: 0.05 E9/L (ref 0–0.2)
BASOPHILS RELATIVE PERCENT: 0.6 % (ref 0–2)
EOSINOPHILS ABSOLUTE: 0.17 E9/L (ref 0.05–0.5)
EOSINOPHILS RELATIVE PERCENT: 2.1 % (ref 0–6)
HCT VFR BLD CALC: 39.9 % (ref 34–48)
HEMOGLOBIN: 12.7 G/DL (ref 11.5–15.5)
IMMATURE GRANULOCYTES #: 0.02 E9/L
IMMATURE GRANULOCYTES %: 0.3 % (ref 0–5)
LYMPHOCYTES ABSOLUTE: 2.5 E9/L (ref 1.5–4)
LYMPHOCYTES RELATIVE PERCENT: 31.6 % (ref 20–42)
MCH RBC QN AUTO: 31.1 PG (ref 26–35)
MCHC RBC AUTO-ENTMCNC: 31.8 % (ref 32–34.5)
MCV RBC AUTO: 97.8 FL (ref 80–99.9)
MONOCYTES ABSOLUTE: 0.39 E9/L (ref 0.1–0.95)
MONOCYTES RELATIVE PERCENT: 4.9 % (ref 2–12)
NEUTROPHILS ABSOLUTE: 4.78 E9/L (ref 1.8–7.3)
NEUTROPHILS RELATIVE PERCENT: 60.5 % (ref 43–80)
PDW BLD-RTO: 14.4 FL (ref 11.5–15)
PLATELET # BLD: 368 E9/L (ref 130–450)
PMV BLD AUTO: 9 FL (ref 7–12)
PROLACTIN: 51.99 NG/ML
RBC # BLD: 4.08 E12/L (ref 3.5–5.5)
T4 TOTAL: 8.6 MCG/DL (ref 4.5–11.7)
TSH SERPL DL<=0.05 MIU/L-ACNC: 1.06 UIU/ML (ref 0.27–4.2)
WBC # BLD: 7.9 E9/L (ref 4.5–11.5)

## 2022-09-14 PROCEDURE — 36415 COLL VENOUS BLD VENIPUNCTURE: CPT

## 2022-09-14 PROCEDURE — 84146 ASSAY OF PROLACTIN: CPT

## 2022-09-14 PROCEDURE — 85025 COMPLETE CBC W/AUTO DIFF WBC: CPT

## 2022-09-14 PROCEDURE — 84436 ASSAY OF TOTAL THYROXINE: CPT

## 2022-09-14 PROCEDURE — 84443 ASSAY THYROID STIM HORMONE: CPT

## 2022-09-19 DIAGNOSIS — S52.571A OTHER CLOSED INTRA-ARTICULAR FRACTURE OF DISTAL END OF RIGHT RADIUS, INITIAL ENCOUNTER: ICD-10-CM

## 2022-09-19 RX ORDER — LORATADINE 10 MG
TABLET ORAL
Qty: 90 TABLET | Refills: 0 | Status: SHIPPED | OUTPATIENT
Start: 2022-09-19

## 2022-09-19 NOTE — TELEPHONE ENCOUNTER
Refill request received via pharmacy interface for Vitamin C. Last OV: 2/23/2022  OP:SURGEON: Dr. Kezia Sharma MD  DATE OF PROCEDURE: 12/2/21  PROCEDURE: Right distal radius ORIF    Medication pended and routed to providers for decision and signature. No future appointments.

## 2022-12-14 ENCOUNTER — OFFICE VISIT (OUTPATIENT)
Dept: PRIMARY CARE CLINIC | Age: 27
End: 2022-12-14

## 2022-12-14 VITALS
DIASTOLIC BLOOD PRESSURE: 72 MMHG | SYSTOLIC BLOOD PRESSURE: 106 MMHG | HEIGHT: 63 IN | HEART RATE: 90 BPM | OXYGEN SATURATION: 98 % | WEIGHT: 149.4 LBS | BODY MASS INDEX: 26.47 KG/M2

## 2022-12-14 DIAGNOSIS — J45.20 MILD INTERMITTENT ASTHMA WITHOUT COMPLICATION: ICD-10-CM

## 2022-12-14 DIAGNOSIS — G43.109 MIGRAINE WITH AURA AND WITHOUT STATUS MIGRAINOSUS, NOT INTRACTABLE: ICD-10-CM

## 2022-12-14 LAB
ALBUMIN SERPL-MCNC: 4.3 G/DL (ref 3.5–5.2)
ALP BLD-CCNC: 60 U/L (ref 35–104)
ALT SERPL-CCNC: 25 U/L (ref 0–32)
ANION GAP SERPL CALCULATED.3IONS-SCNC: 11 MMOL/L (ref 7–16)
AST SERPL-CCNC: 22 U/L (ref 0–31)
BILIRUB SERPL-MCNC: 0.3 MG/DL (ref 0–1.2)
BUN BLDV-MCNC: 12 MG/DL (ref 6–20)
CALCIUM SERPL-MCNC: 9.6 MG/DL (ref 8.6–10.2)
CHLORIDE BLD-SCNC: 105 MMOL/L (ref 98–107)
CHOLESTEROL, TOTAL: 245 MG/DL (ref 0–199)
CO2: 25 MMOL/L (ref 22–29)
CREAT SERPL-MCNC: 0.8 MG/DL (ref 0.5–1)
GFR SERPL CREATININE-BSD FRML MDRD: >60 ML/MIN/1.73
GLUCOSE BLD-MCNC: 118 MG/DL (ref 74–99)
HBA1C MFR BLD: 5.7 % (ref 4–5.6)
HDLC SERPL-MCNC: 43 MG/DL
LDL CHOLESTEROL CALCULATED: 177 MG/DL (ref 0–99)
POTASSIUM SERPL-SCNC: 4.4 MMOL/L (ref 3.5–5)
SODIUM BLD-SCNC: 141 MMOL/L (ref 132–146)
TOTAL PROTEIN: 6.8 G/DL (ref 6.4–8.3)
TRIGL SERPL-MCNC: 127 MG/DL (ref 0–149)
VLDLC SERPL CALC-MCNC: 25 MG/DL

## 2022-12-14 RX ORDER — BUSPIRONE HYDROCHLORIDE 7.5 MG/1
TABLET ORAL
COMMUNITY
Start: 2022-11-02

## 2022-12-14 RX ORDER — LEVONORGESTREL / ETHINYL ESTRADIOL AND ETHINYL ESTRADIOL 150-30(84)
KIT ORAL
COMMUNITY
Start: 2022-11-02

## 2022-12-14 RX ORDER — PNV NO.95/FERROUS FUM/FOLIC AC 28MG-0.8MG
TABLET ORAL
COMMUNITY
Start: 2022-09-11

## 2022-12-14 RX ORDER — FLUTICASONE PROPIONATE 50 MCG
SPRAY, SUSPENSION (ML) NASAL
COMMUNITY
Start: 2022-12-02

## 2022-12-14 RX ORDER — AMOXICILLIN AND CLAVULANATE POTASSIUM 875; 125 MG/1; MG/1
TABLET, FILM COATED ORAL
COMMUNITY
Start: 2022-12-02

## 2022-12-14 SDOH — ECONOMIC STABILITY: FOOD INSECURITY: WITHIN THE PAST 12 MONTHS, YOU WORRIED THAT YOUR FOOD WOULD RUN OUT BEFORE YOU GOT MONEY TO BUY MORE.: NEVER TRUE

## 2022-12-14 SDOH — ECONOMIC STABILITY: FOOD INSECURITY: WITHIN THE PAST 12 MONTHS, THE FOOD YOU BOUGHT JUST DIDN'T LAST AND YOU DIDN'T HAVE MONEY TO GET MORE.: NEVER TRUE

## 2022-12-14 ASSESSMENT — PATIENT HEALTH QUESTIONNAIRE - PHQ9
1. LITTLE INTEREST OR PLEASURE IN DOING THINGS: 0
2. FEELING DOWN, DEPRESSED OR HOPELESS: 0
SUM OF ALL RESPONSES TO PHQ9 QUESTIONS 1 & 2: 0
SUM OF ALL RESPONSES TO PHQ QUESTIONS 1-9: 0

## 2022-12-14 ASSESSMENT — SOCIAL DETERMINANTS OF HEALTH (SDOH): HOW HARD IS IT FOR YOU TO PAY FOR THE VERY BASICS LIKE FOOD, HOUSING, MEDICAL CARE, AND HEATING?: NOT HARD AT ALL

## 2022-12-14 NOTE — PROGRESS NOTES
Angle Duncan is a 33 y.o. female new patient with Hx of gestastional diabetes and preeclampsia  asthma for evaluation feels fine no specific complaint record from hospital reviewed     Past Medical History:   Diagnosis Date    Asthma     Migraines        Past Surgical History:   Procedure Laterality Date     SECTION N/A 2022     SECTION performed by Radames Solorzano MD at [facility] OR    SEPTOPLASTY      WISDOM TOOTH EXTRACTION      WRIST FRACTURE SURGERY Right 2021    RIGHT DISTAL RADIUS OPEN REDUCTION INTERNAL FIXATION performed by Debra Mckeon MD at [address] [place] [address] OR       No family history on file.           Current Outpatient Medications:     busPIRone (BUSPAR) 7.5 MG tablet, TAKE 1 TABLET BY MOUTH TWICE A DAY, Disp: , Rfl:     fluticasone (FLONASE) 50 MCG/ACT nasal spray, , Disp: , Rfl:     Levonorgest-Eth Estrad -Day 0.15-0.03 &0.01 MG TABS, TAKE 1 TABLET BY MOUTH EVERY DAY, Disp: , Rfl:     Prenatal Vit-Fe Fumarate-FA (PRENATAL VITAMINS) 28-0.8 MG TABS, , Disp: , Rfl:     ascorbic acid ([employer] VITAMIN C) 500 MG tablet, TAKE 1 TABLET BY MOUTH EVERY DAY, Disp: 90 tablet, Rfl: 0    SUMAtriptan (IMITREX) 100 MG tablet, Take 100 mg by mouth once as needed for Migraine, Disp: , Rfl:     Magnesium Oxide 500 MG TABS, Take 500 mg by mouth daily, Disp: , Rfl:     montelukast (SINGULAIR) 10 MG tablet, Take 1 tablet by mouth nightly, Disp: , Rfl:     Riboflavin (B-2) 100 MG TABS, Take 1 tablet by mouth daily, Disp: , Rfl:     loratadine (CLARITIN) 10 MG tablet, Take 10 mg by mouth daily, Disp: , Rfl:     albuterol (PROVENTIL HFA;VENTOLIN HFA) 108 (90 BASE) MCG/ACT inhaler, Inhale 2 puffs into the lungs every 6 hours as needed for Wheezing., Disp: , Rfl:     amoxicillin-clavulanate (AUGMENTIN) 875-125 MG per tablet, , Disp: , Rfl:     ibuprofen (ADVIL;MOTRIN) 600 MG tablet, Take 1 tablet by mouth every 6 hours as needed for Pain (Patient not taking: Reported on 2022), Disp: 60 tablet, Rfl: 1    butalbital-acetaminophen-caffeine (FIORICET, ESGIC) -40 MG per tablet, Take 1 tablet by mouth every 4 hours as needed for Headaches (Patient not taking: Reported on 12/14/2022), Disp: , Rfl:     nortriptyline (PAMELOR) 75 MG capsule, Take 75 mg by mouth nightly (Patient not taking: Reported on 12/14/2022), Disp: , Rfl:      Allergies: Latex and Azithromycin    Social History     Tobacco Use    Smoking status: Never    Smokeless tobacco: Never   Substance Use Topics    Alcohol use: Not Currently     Comment: socially        Review of Systems:  Respiratory: negative for cough and hemoptysis  Cardiovascular: negative for chest pain and dyspnea  Gastrointestinal: negative for abdominal pain, diarrhea, nausea and vomiting  Genitourinary:negative for dysuria and hematuria  Derm: negative for rash and skin lesion(s)  Neurological: negative for seizures and tremors  Endocrine: negative for diabetic symptoms including polydipsia and polyuria    Physical Exam:  Vitals:    12/14/22 0958   BP: 106/72   Pulse: 90   SpO2: 98%      Wt Readings from Last 3 Encounters:   12/14/22 149 lb 6.4 oz (67.8 kg)   12/02/21 145 lb (65.8 kg)   11/30/21 145 lb (65.8 kg)       Skin:  Warm and dry. No rash or bruises  HEENT:  PERRLA, EOMI  Neck:  No JVD, No thyromegaly, No carotid bruit  Cardiac:  RRR, No gallop or murmur  Lungs:  CTA, Normal percussion  Abdomen: Normal bowel sounds, no HSM, non-tender  Extremities:  No clubbing, edema or cyanosis  Neurological:  Moves all extremities.     Lab Results   Component Value Date     09/04/2022    K 4.3 09/04/2022     09/04/2022    CO2 27 09/04/2022    BUN 11 09/04/2022    CREATININE 0.9 09/04/2022    GLUCOSE 126 (H) 09/04/2022    CALCIUM 8.4 (L) 09/04/2022    PROT 5.0 (L) 09/04/2022    LABALBU 2.5 (L) 09/04/2022    BILITOT <0.2 09/04/2022    ALKPHOS 154 (H) 09/04/2022    AST 28 09/04/2022    ALT 11 09/04/2022    LABGLOM >60 09/04/2022    GFRAA >60 09/04/2022     Lab Results Component Value Date    WBC 7.9 09/14/2022    HGB 12.7 09/14/2022    HCT 39.9 09/14/2022    MCV 97.8 09/14/2022     09/14/2022     No results found for: CHOL, TRIG, HDL, LDLCHOLESTEROL, LDLCALC, LABVLDL, VLDL, CHOLHDLRATIO  No results found for: LABA1C        Assessment and Plan:    Patient Active Problem List   Diagnosis    Closed fracture of right distal radius    Migraine with acute onset aura    Mild intermittent asthma without complication    Headache in pregnancy, antepartum    Severe pre-eclampsia in third trimester    Dichorionic diamniotic twin pregnancy in third trimester    Primigravida in third trimester    Abnormal LFTs       Diagnosis/Orders  1. Gestational diabetes mellitus in childbirth  -     Comprehensive Metabolic Panel; Future  -     LIPID PANEL; Future  -     Hemoglobin A1C; Future  2. Mild intermittent asthma without complication  3. Migraine with aura and without status migrainosus, not intractable    Return in about 3 months (around 3/14/2023).       Tommy Vang MD

## 2023-06-26 ENCOUNTER — HOSPITAL ENCOUNTER (EMERGENCY)
Age: 28
Discharge: HOME | End: 2023-06-26
Attending: EMERGENCY MEDICINE
Payer: COMMERCIAL

## 2023-06-26 ENCOUNTER — APPOINTMENT (OUTPATIENT)
Dept: ULTRASOUND IMAGING | Age: 28
End: 2023-06-26
Payer: COMMERCIAL

## 2023-06-26 VITALS
HEART RATE: 78 BPM | HEIGHT: 62 IN | SYSTOLIC BLOOD PRESSURE: 121 MMHG | OXYGEN SATURATION: 100 % | WEIGHT: 152 LBS | TEMPERATURE: 97.8 F | RESPIRATION RATE: 16 BRPM | BODY MASS INDEX: 27.97 KG/M2 | DIASTOLIC BLOOD PRESSURE: 93 MMHG

## 2023-06-26 DIAGNOSIS — R10.13 ABDOMINAL PAIN, EPIGASTRIC: Primary | ICD-10-CM

## 2023-06-26 LAB
ALBUMIN SERPL-MCNC: 4.6 G/DL (ref 3.5–5.2)
ALP SERPL-CCNC: 50 U/L (ref 35–104)
ALT SERPL-CCNC: 21 U/L (ref 0–32)
ANION GAP SERPL CALCULATED.3IONS-SCNC: 11 MMOL/L (ref 7–16)
AST SERPL-CCNC: 21 U/L (ref 0–31)
BACTERIA URNS QL MICRO: ABNORMAL /HPF
BASOPHILS # BLD: 0.02 E9/L (ref 0–0.2)
BASOPHILS NFR BLD: 0.3 % (ref 0–2)
BILIRUB SERPL-MCNC: 0.5 MG/DL (ref 0–1.2)
BILIRUB UR QL STRIP: NEGATIVE
BUN SERPL-MCNC: 9 MG/DL (ref 6–20)
CALCIUM SERPL-MCNC: 9.5 MG/DL (ref 8.6–10.2)
CHLORIDE SERPL-SCNC: 101 MMOL/L (ref 98–107)
CLARITY UR: CLEAR
CO2 SERPL-SCNC: 24 MMOL/L (ref 22–29)
COLOR UR: YELLOW
CREAT SERPL-MCNC: 0.8 MG/DL (ref 0.5–1)
D DIMER: 220 NG/ML DDU
EOSINOPHIL # BLD: 0.25 E9/L (ref 0.05–0.5)
EOSINOPHIL NFR BLD: 3.2 % (ref 0–6)
ERYTHROCYTE [DISTWIDTH] IN BLOOD BY AUTOMATED COUNT: 12.5 FL (ref 11.5–15)
GLUCOSE SERPL-MCNC: 124 MG/DL (ref 74–99)
GLUCOSE UR STRIP-MCNC: NEGATIVE MG/DL
HCG UR QL: NEGATIVE
HCT VFR BLD AUTO: 40.5 % (ref 34–48)
HGB BLD-MCNC: 13.7 G/DL (ref 11.5–15.5)
HGB UR QL STRIP: NEGATIVE
IMM GRANULOCYTES # BLD: 0.02 E9/L
IMM GRANULOCYTES NFR BLD: 0.3 % (ref 0–5)
KETONES UR STRIP-MCNC: NEGATIVE MG/DL
LACTATE BLDV-SCNC: 1.3 MMOL/L (ref 0.5–2.2)
LEUKOCYTE ESTERASE UR QL STRIP: ABNORMAL
LIPASE: 20 U/L (ref 13–60)
LYMPHOCYTES # BLD: 2.82 E9/L (ref 1.5–4)
LYMPHOCYTES NFR BLD: 35.8 % (ref 20–42)
MCH RBC QN AUTO: 30 PG (ref 26–35)
MCHC RBC AUTO-ENTMCNC: 33.8 % (ref 32–34.5)
MCV RBC AUTO: 88.6 FL (ref 80–99.9)
MONOCYTES # BLD: 0.39 E9/L (ref 0.1–0.95)
MONOCYTES NFR BLD: 5 % (ref 2–12)
NEUTROPHILS # BLD: 4.37 E9/L (ref 1.8–7.3)
NEUTS SEG NFR BLD: 55.4 % (ref 43–80)
NITRITE UR QL STRIP: NEGATIVE
PH UR STRIP: 8 [PH] (ref 5–9)
PLATELET # BLD AUTO: 319 E9/L (ref 130–450)
PMV BLD AUTO: 9.1 FL (ref 7–12)
POTASSIUM SERPL-SCNC: 4.1 MMOL/L (ref 3.5–5)
PROT SERPL-MCNC: 7.6 G/DL (ref 6.4–8.3)
PROT UR STRIP-MCNC: NEGATIVE MG/DL
RBC # BLD AUTO: 4.57 E12/L (ref 3.5–5.5)
RBC #/AREA URNS HPF: ABNORMAL /HPF (ref 0–2)
SODIUM SERPL-SCNC: 136 MMOL/L (ref 132–146)
SP GR UR STRIP: 1.01 (ref 1–1.03)
TROPONIN, HIGH SENSITIVITY: <6 NG/L (ref 0–9)
UROBILINOGEN UR STRIP-ACNC: 0.2 E.U./DL
WBC # BLD: 7.9 E9/L (ref 4.5–11.5)
WBC #/AREA URNS HPF: ABNORMAL /HPF (ref 0–5)

## 2023-06-26 PROCEDURE — 2580000003 HC RX 258: Performed by: EMERGENCY MEDICINE

## 2023-06-26 PROCEDURE — 99284 EMERGENCY DEPT VISIT MOD MDM: CPT

## 2023-06-26 PROCEDURE — A4216 STERILE WATER/SALINE, 10 ML: HCPCS | Performed by: EMERGENCY MEDICINE

## 2023-06-26 PROCEDURE — 96375 TX/PRO/DX INJ NEW DRUG ADDON: CPT

## 2023-06-26 PROCEDURE — 93005 ELECTROCARDIOGRAM TRACING: CPT | Performed by: EMERGENCY MEDICINE

## 2023-06-26 PROCEDURE — 81001 URINALYSIS AUTO W/SCOPE: CPT

## 2023-06-26 PROCEDURE — 84484 ASSAY OF TROPONIN QUANT: CPT

## 2023-06-26 PROCEDURE — 80053 COMPREHEN METABOLIC PANEL: CPT

## 2023-06-26 PROCEDURE — 96374 THER/PROPH/DIAG INJ IV PUSH: CPT

## 2023-06-26 PROCEDURE — 83605 ASSAY OF LACTIC ACID: CPT

## 2023-06-26 PROCEDURE — 85378 FIBRIN DEGRADE SEMIQUANT: CPT

## 2023-06-26 PROCEDURE — 83690 ASSAY OF LIPASE: CPT

## 2023-06-26 PROCEDURE — 36415 COLL VENOUS BLD VENIPUNCTURE: CPT

## 2023-06-26 PROCEDURE — C9113 INJ PANTOPRAZOLE SODIUM, VIA: HCPCS | Performed by: EMERGENCY MEDICINE

## 2023-06-26 PROCEDURE — 81025 URINE PREGNANCY TEST: CPT

## 2023-06-26 PROCEDURE — 85025 COMPLETE CBC W/AUTO DIFF WBC: CPT

## 2023-06-26 PROCEDURE — 6360000002 HC RX W HCPCS: Performed by: EMERGENCY MEDICINE

## 2023-06-26 PROCEDURE — 76705 ECHO EXAM OF ABDOMEN: CPT

## 2023-06-26 RX ORDER — 0.9 % SODIUM CHLORIDE 0.9 %
500 INTRAVENOUS SOLUTION INTRAVENOUS ONCE
Status: COMPLETED | OUTPATIENT
Start: 2023-06-26 | End: 2023-06-26

## 2023-06-26 RX ORDER — ONDANSETRON 2 MG/ML
4 INJECTION INTRAMUSCULAR; INTRAVENOUS ONCE
Status: COMPLETED | OUTPATIENT
Start: 2023-06-26 | End: 2023-06-26

## 2023-06-26 RX ORDER — SUCRALFATE 1 G/1
1 TABLET ORAL 4 TIMES DAILY
Qty: 120 TABLET | Refills: 3 | Status: SHIPPED | OUTPATIENT
Start: 2023-06-26

## 2023-06-26 RX ORDER — PANTOPRAZOLE SODIUM 40 MG/1
40 TABLET, DELAYED RELEASE ORAL
Qty: 30 TABLET | Refills: 0 | Status: SHIPPED | OUTPATIENT
Start: 2023-06-26

## 2023-06-26 RX ADMIN — ONDANSETRON 4 MG: 2 INJECTION INTRAMUSCULAR; INTRAVENOUS at 14:19

## 2023-06-26 RX ADMIN — SODIUM CHLORIDE 500 ML: 9 INJECTION, SOLUTION INTRAVENOUS at 14:19

## 2023-06-26 RX ADMIN — PANTOPRAZOLE SODIUM 40 MG: 40 INJECTION, POWDER, LYOPHILIZED, FOR SOLUTION INTRAVENOUS at 14:21

## 2023-06-26 ASSESSMENT — PAIN DESCRIPTION - PAIN TYPE: TYPE: ACUTE PAIN

## 2023-06-26 ASSESSMENT — PAIN DESCRIPTION - DESCRIPTORS: DESCRIPTORS: ACHING

## 2023-06-26 ASSESSMENT — PAIN SCALES - GENERAL: PAINLEVEL_OUTOF10: 6

## 2023-06-26 ASSESSMENT — PAIN - FUNCTIONAL ASSESSMENT: PAIN_FUNCTIONAL_ASSESSMENT: 0-10

## 2023-06-26 ASSESSMENT — PAIN DESCRIPTION - LOCATION: LOCATION: BACK;ABDOMEN

## 2023-06-27 LAB
EKG ATRIAL RATE: 76 BPM
EKG P AXIS: -9 DEGREES
EKG P-R INTERVAL: 146 MS
EKG Q-T INTERVAL: 382 MS
EKG QRS DURATION: 76 MS
EKG QTC CALCULATION (BAZETT): 429 MS
EKG R AXIS: 72 DEGREES
EKG T AXIS: 44 DEGREES
EKG VENTRICULAR RATE: 76 BPM

## 2023-06-27 PROCEDURE — 93010 ELECTROCARDIOGRAM REPORT: CPT | Performed by: INTERNAL MEDICINE

## 2023-08-22 ENCOUNTER — TELEPHONE (OUTPATIENT)
Dept: ORTHOPEDIC SURGERY | Age: 28
End: 2023-08-22

## 2023-08-22 NOTE — TELEPHONE ENCOUNTER
Patient called for appt d/t pain in Rt wrist. Patient give appt for 9/5/23 @10:15am at [facility] office.

## 2023-09-05 ENCOUNTER — OFFICE VISIT (OUTPATIENT)
Dept: ORTHOPEDIC SURGERY | Age: 28
End: 2023-09-05
Payer: COMMERCIAL

## 2023-09-05 DIAGNOSIS — S52.571A OTHER CLOSED INTRA-ARTICULAR FRACTURE OF DISTAL END OF RIGHT RADIUS, INITIAL ENCOUNTER: Primary | ICD-10-CM

## 2023-09-05 DIAGNOSIS — S52.571D OTHER CLOSED INTRA-ARTICULAR FRACTURE OF DISTAL END OF RIGHT RADIUS WITH ROUTINE HEALING, SUBSEQUENT ENCOUNTER: Primary | ICD-10-CM

## 2023-09-05 PROCEDURE — 1036F TOBACCO NON-USER: CPT | Performed by: PHYSICIAN ASSISTANT

## 2023-09-05 PROCEDURE — G8419 CALC BMI OUT NRM PARAM NOF/U: HCPCS | Performed by: PHYSICIAN ASSISTANT

## 2023-09-05 PROCEDURE — 99213 OFFICE O/P EST LOW 20 MIN: CPT | Performed by: PHYSICIAN ASSISTANT

## 2023-09-05 PROCEDURE — G8427 DOCREV CUR MEDS BY ELIG CLIN: HCPCS | Performed by: PHYSICIAN ASSISTANT

## 2023-09-05 NOTE — PROGRESS NOTES
Chief Complaint   Patient presents with    Follow-up     Hx of right distal radius ORIF. Started as a dull ache then pain increased with pain radiating into hand and forearm. She had a pop in the wrist and the pain began to get better       OP:SURGEON: Dr. Adonay Sneed MD  DATE OF PROCEDURE: 12/2/21  PROCEDURE: Right distal radius ORIF    Subjective:  Lucia Ramos is here for right wrist pain occurring approximately 3 weeks ago without injury or trauma, progressively worsening until about a week or so ago she experienced a \"pop\" to the right wrist and then her wrist pain began to get better on its own. She tried over-the-counter analgesics when she first began having pain which did not work very well until her right wrist \"popped\". No change to range of motion or strength of the wrist.    Review of Systems -  all pertinent positives and negatives in HPI. Objective:    General: Alert and oriented X 3, normocephalic atraumatic, external ears and eye normal, sclera clear, no acute distress, respirations easy and unlabored with no audible wheezes, skin warm and dry, speech and dress appropriate for noted age, affect euthymic. Extremity:  Right Upper Extremity  Skin is clean dry and intact  No edema  Full AROM of the wrist without discomfort  Very mild TTP radial aspect of the wrist   Radial pulse palpable, fingers warm with BCR  Flex/extension intact to thumb and fingers  Finger opposition intact  Finger adduction/abduction intact  Finger crossover intact  Subjectively states sensation intact to radial/medial/ulnar distribution  Incisional scar is healed       XR:   3 views of R wrist demonstrating healed distal radius fracture s/p ORIF. Hardware remains intact without interval displacement, loosening, or failure. No significant change in alignment. No acute fractures or dislocations or any other osseus abnormality identified. Assessment:   Diagnosis Orders   1.  Other closed intra-articular fracture

## 2023-09-22 ENCOUNTER — HOSPITAL ENCOUNTER (EMERGENCY)
Age: 28
Discharge: HOME | End: 2023-09-23
Attending: EMERGENCY MEDICINE
Payer: COMMERCIAL

## 2023-09-22 VITALS
BODY MASS INDEX: 27.58 KG/M2 | SYSTOLIC BLOOD PRESSURE: 110 MMHG | TEMPERATURE: 97.6 F | WEIGHT: 150.8 LBS | RESPIRATION RATE: 18 BRPM | OXYGEN SATURATION: 100 % | HEART RATE: 110 BPM | DIASTOLIC BLOOD PRESSURE: 77 MMHG

## 2023-09-22 DIAGNOSIS — H60.391 INFECTIVE OTITIS EXTERNA OF RIGHT EAR: Primary | ICD-10-CM

## 2023-09-22 DIAGNOSIS — H92.01 OTALGIA OF RIGHT EAR: ICD-10-CM

## 2023-09-22 PROCEDURE — 99283 EMERGENCY DEPT VISIT LOW MDM: CPT

## 2023-09-22 ASSESSMENT — LIFESTYLE VARIABLES: HOW OFTEN DO YOU HAVE A DRINK CONTAINING ALCOHOL: MONTHLY OR LESS

## 2023-09-23 PROCEDURE — 6370000000 HC RX 637 (ALT 250 FOR IP): Performed by: EMERGENCY MEDICINE

## 2023-09-23 RX ORDER — ONDANSETRON 4 MG/1
8 TABLET, ORALLY DISINTEGRATING ORAL 3 TIMES DAILY PRN
Qty: 14 TABLET | Refills: 0 | Status: SHIPPED | OUTPATIENT
Start: 2023-09-23 | End: 2023-09-23 | Stop reason: SDUPTHER

## 2023-09-23 RX ORDER — ACETIC ACID 3 %
30 LIQUID (ML) MISCELLANEOUS EVERY 6 HOURS
Qty: 500 ML | Refills: 0 | Status: SHIPPED | OUTPATIENT
Start: 2023-09-23

## 2023-09-23 RX ORDER — IBUPROFEN 600 MG/1
600 TABLET ORAL ONCE
Status: COMPLETED | OUTPATIENT
Start: 2023-09-23 | End: 2023-09-23

## 2023-09-23 RX ORDER — CIPROFLOXACIN 500 MG/1
500 TABLET, FILM COATED ORAL 2 TIMES DAILY
Qty: 14 TABLET | Refills: 0 | Status: SHIPPED | OUTPATIENT
Start: 2023-09-23 | End: 2023-09-30

## 2023-09-23 RX ORDER — CIPROFLOXACIN 500 MG/1
500 TABLET, FILM COATED ORAL ONCE
Status: COMPLETED | OUTPATIENT
Start: 2023-09-23 | End: 2023-09-23

## 2023-09-23 RX ORDER — OXYCODONE HYDROCHLORIDE AND ACETAMINOPHEN 5; 325 MG/1; MG/1
1 TABLET ORAL EVERY 8 HOURS PRN
Qty: 8 TABLET | Refills: 0 | Status: SHIPPED | OUTPATIENT
Start: 2023-09-23 | End: 2023-09-26

## 2023-09-23 RX ORDER — ONDANSETRON 4 MG/1
8 TABLET, ORALLY DISINTEGRATING ORAL 3 TIMES DAILY PRN
Qty: 6 TABLET | Refills: 0 | Status: SHIPPED | OUTPATIENT
Start: 2023-09-23

## 2023-09-23 RX ORDER — IBUPROFEN 600 MG/1
600 TABLET ORAL EVERY 8 HOURS PRN
Qty: 15 TABLET | Refills: 0 | Status: SHIPPED | OUTPATIENT
Start: 2023-09-23 | End: 2023-09-28

## 2023-09-23 RX ADMIN — IBUPROFEN 600 MG: 600 TABLET ORAL at 00:10

## 2023-09-23 RX ADMIN — CIPROFLOXACIN 500 MG: 500 TABLET, FILM COATED ORAL at 00:10

## 2023-09-23 ASSESSMENT — PAIN DESCRIPTION - DESCRIPTORS: DESCRIPTORS: ACHING;SORE

## 2023-09-23 ASSESSMENT — PAIN - FUNCTIONAL ASSESSMENT: PAIN_FUNCTIONAL_ASSESSMENT: 0-10

## 2023-09-23 ASSESSMENT — PAIN DESCRIPTION - LOCATION: LOCATION: EAR

## 2023-09-23 ASSESSMENT — PAIN DESCRIPTION - ORIENTATION: ORIENTATION: RIGHT

## 2023-09-23 ASSESSMENT — PAIN SCALES - GENERAL: PAINLEVEL_OUTOF10: 8

## 2023-09-23 NOTE — DISCHARGE INSTRUCTIONS
NOTE:  You must see the ENT specialist (Dr. Padmaja Stoner) listed below for follow-up in 2 days. Get immediate medical attention if any new/worsening symptoms occur. Apply the eardrops (Acetic Acid 3% drops) to the right ear area every 6 hours.

## (undated) DEVICE — SUTURE CHROMIC GUT SZ 2-0 L36IN ABSRB BRN L36MM CT-1 1/2 923H

## (undated) DEVICE — BLADE SURG NO20 S STL STR DISP GLASSVAN

## (undated) DEVICE — DRILL SYSTEM 7

## (undated) DEVICE — GLOVE SURG SZ 75 L12IN FNGR THK83MIL CRM POLYISOPRENE

## (undated) DEVICE — SPINAL INSTRUMENT RONGEUR (STRAIGHT) 8MM
Type: IMPLANTABLE DEVICE | Site: WRIST | Status: NON-FUNCTIONAL
Removed: 2021-12-02

## (undated) DEVICE — SUTURE CHROMIC GUT SZ 1 L36IN ABSRB BRN L40MM CT 1/2 CIR 915H

## (undated) DEVICE — COVER,LIGHT HANDLE,FLX,2/PK: Brand: MEDLINE INDUSTRIES, INC.

## (undated) DEVICE — ELECTRODE PT RET AD L9FT HI MOIST COND ADH HYDRGEL CORDED

## (undated) DEVICE — SKIN AFFIX SURG ADHESIVE 72/CS 0.55ML: Brand: MEDLINE

## (undated) DEVICE — PENCIL ES L3M BTTN SWCH HOLSTER W/ BLDE ELECTRD EDGE

## (undated) DEVICE — SPONGE LAP W18XL18IN WHT COT 4 PLY FLD STRUNG RADPQ DISP ST

## (undated) DEVICE — DRAPE EQUIP CARM 72X42 IN RUBBER BND CLP

## (undated) DEVICE — SURGICAL PROCEDURE PACK HND

## (undated) DEVICE — CONTAINER SPEC 64OZ POLYPR PATH SNAP LOK CAP W/ LID

## (undated) DEVICE — GOWN,SIRUS,FABRNF,L,20/CS: Brand: MEDLINE

## (undated) DEVICE — SHEET,DRAPE,53X77,STERILE: Brand: MEDLINE

## (undated) DEVICE — CATHETERIZATION KIT FOL16 FR 2000 CC DRAINAGE BG LUBRICATH

## (undated) DEVICE — 4-PORT MANIFOLD: Brand: NEPTUNE 2

## (undated) DEVICE — GLOVE SURG SZ 8 L12IN FNGR THK79MIL GRN LTX FREE

## (undated) DEVICE — PEN: MARKING STD 100/CS: Brand: MEDICAL ACTION INDUSTRIES

## (undated) DEVICE — SET ORTHO STD STORTSTD2

## (undated) DEVICE — COUNTER NDL 30 COUNT DBL MAG

## (undated) DEVICE — READY WET SKIN SCRUB TRAY-LF: Brand: MEDLINE INDUSTRIES, INC.

## (undated) DEVICE — DOUBLE BASIN SET: Brand: MEDLINE INDUSTRIES, INC.

## (undated) DEVICE — TOWEL,OR,DSP,ST,BLUE,STD,6/PK,12PK/CS: Brand: MEDLINE

## (undated) DEVICE — BIT DRL L110MM DIA1.8MM QUIK CPL CALIB W/O STP REUSE

## (undated) DEVICE — TUBING, SUCTION, 3/16" X 12', STRAIGHT: Brand: MEDLINE

## (undated) DEVICE — CONTAINER,SPEC,PNEUM TUBE,3OZ,STRL PATH: Brand: MEDLINE

## (undated) DEVICE — BIT DRL L100MM DIA2MM ST QUIK CPL NONRADIOPAQUE W/O STP

## (undated) DEVICE — GLOVE SURG SZ 65 L12IN FNGR THK83MIL CRM POLYISOPRENE

## (undated) DEVICE — CESAREAN BIRTH PACK II: Brand: MEDLINE INDUSTRIES, INC.

## (undated) DEVICE — SUTURE VCRL + SZ 4-0 L27IN ABSRB UD PS-2 3/8 CIR REV CUT VCP426H

## (undated) DEVICE — GOWN,BREATHABLE SLV,AURORA,XLG,STRL: Brand: MEDLINE

## (undated) DEVICE — GOWN,AURORA,BRTHSLV,2XL,18/CS: Brand: MEDLINE

## (undated) DEVICE — GLOVE ORTHO 8   MSG9480

## (undated) DEVICE — PADDING,UNDERCAST,COTTON, 4"X4YD STERILE: Brand: MEDLINE

## (undated) DEVICE — MEDI-VAC YANKAUER SUCTION HANDLE W/BULBOUS TIP: Brand: CARDINAL HEALTH

## (undated) DEVICE — SPLINT CAST W4XL15IN GRN STRENGTH PLSTR OF PARIS FAST SET

## (undated) DEVICE — ZIMMER® STERILE DISPOSABLE TOURNIQUET CUFF WITH PROTECTIVE SLEEVE AND PLC, DUAL PORT, SINGLE BLADDER, 18 IN. (46 CM)

## (undated) DEVICE — SUTURE PLN GUT SZ 3-0 L27IN ABSRB YELLOWISH TAN L36MM CT-1 842H

## (undated) DEVICE — TUBE BLD COLLECT ST 1 SIL COAT 7ML 10ML

## (undated) DEVICE — SUTURE VCRL + SZ 0 L36IN ABSRB VLT L36MM CT-1 1/2 CIR VCP346H

## (undated) DEVICE — CLOTH SURG PREP PREOPERATIVE CHLORHEXIDINE GLUC 2% READYPREP

## (undated) DEVICE — 3000CC GUARDIAN II: Brand: GUARDIAN

## (undated) DEVICE — SCREW BNE L14MM DIA2.7MM CORT S STL ST T8 STARDRV RECESS
Type: IMPLANTABLE DEVICE | Site: WRIST | Status: NON-FUNCTIONAL
Removed: 2021-12-02